# Patient Record
Sex: MALE | Race: WHITE | NOT HISPANIC OR LATINO | Employment: UNEMPLOYED | URBAN - METROPOLITAN AREA
[De-identification: names, ages, dates, MRNs, and addresses within clinical notes are randomized per-mention and may not be internally consistent; named-entity substitution may affect disease eponyms.]

---

## 2018-11-01 ENCOUNTER — HOSPITAL ENCOUNTER (OUTPATIENT)
Dept: RADIATION ONCOLOGY | Facility: HOSPITAL | Age: 63
Setting detail: RADIATION/ONCOLOGY SERIES
Discharge: HOME | End: 2018-11-01

## 2018-11-01 ENCOUNTER — OFFICE VISIT (OUTPATIENT)
Dept: RADIATION ONCOLOGY | Facility: HOSPITAL | Age: 63
End: 2018-11-01

## 2018-11-01 VITALS
OXYGEN SATURATION: 95 % | DIASTOLIC BLOOD PRESSURE: 63 MMHG | SYSTOLIC BLOOD PRESSURE: 136 MMHG | TEMPERATURE: 98.2 F | HEART RATE: 60 BPM | WEIGHT: 152.4 LBS | RESPIRATION RATE: 20 BRPM

## 2018-11-01 DIAGNOSIS — C61 PROSTATE CANCER (HCC): Primary | ICD-10-CM

## 2018-11-01 PROCEDURE — G0463 HOSPITAL OUTPT CLINIC VISIT: HCPCS

## 2018-11-01 RX ORDER — LEVOTHYROXINE SODIUM 175 UG/1
TABLET ORAL
COMMUNITY

## 2018-11-01 RX ORDER — PREDNISOLONE ACETATE 10 MG/ML
SUSPENSION/ DROPS OPHTHALMIC
COMMUNITY

## 2018-11-01 NOTE — PROGRESS NOTES
CONSULTATION NOTE      :                                                          1955  DATE OF CONSULTATION:                       2018   REQUESTING PHYSICIAN:                   Thomas Barry, *  REASON FOR CONSULTATION:         Prostate cancer (CMS/[facility])  Staging form: Prostate, AJCC 8th Edition  - Clinical: Stage IIC (cT2b, cN0, cM0, PSA: 7.6, Grade Group: 3) - Signed by Jeancarlos Leon MD on 2018       BRIEF HISTORY:  The patient is a very pleasant 63 y.o. male  with recently diagnosed prostate cancer.  He presented with an elevated PSA with maximum value of 7.61 ng/ml on 2018.  Prostate was moderately enlarged, 39.9 cc, diffusely firm but symmetric with no nodules.  Biopsy was performed 2018.  Prostatic adenocarcinoma was found in 3 out of 6 cores from the right lobe.    Jeaneth score 4+3=7 was identified in 2 cores and right apex (50% submitted tissue).  Bethes[place] score 3+4 = 7 was identified in one core from the right mid gland (less than 5% submitted tissue).  Left lobe was benign.  No evidence of metastatic disease was found in staging studies consisting of nuclear medicine bone scan and CT scans abdomen/pelvis.    No Known Allergies    Social History     Social History   • Marital status:      Social History Main Topics   • Smoking status: Current Every Day Smoker     Packs/day: 1.00     Types: Cigarettes     Start date:    • Smokeless tobacco: Never Used   • Alcohol use 0.6 oz/week     1 Cans of beer per week      Comment: 2 drinks per year   • Drug use: No   • Sexual activity: Defer     Other Topics Concern   • Not on file       Past Medical History:   Diagnosis Date   • Disease of thyroid gland    • H/O cervical fracture     was in [place] for 6 weeks   • Prostate cancer (CMS/HCC)    • Psoriasis    • Skin cancer 2018    basal cell right neck       family history includes Coronary artery disease in his father; Liver cancer in his sister; Ovarian cancer in his  mother.     Past Surgical History:   Procedure Laterality Date   • CARDIAC CATHETERIZATION  2013    x 3 all clean   • CHOLECYSTECTOMY  2003   • COLONOSCOPY  2017   • CYST REMOVAL      right wrist   • FOOT FRACTURE SURGERY     • KNEE ARTHROSCOPY Left 1988   • MOHS SURGERY  2018   • NOSE SURGERY  1995   • PAROTIDECTOMY  2015   • PROSTATE BIOPSY  2018        Review of Systems   Constitutional: Positive for fatigue.   HENT:   Positive for hearing loss and tinnitus.    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    Cardiovascular: Negative.    Gastrointestinal: Positive for nausea.   Endocrine: Negative.    Genitourinary: Positive for dysuria.    Musculoskeletal: Negative.    Skin: Positive for itching and rash.        Bilateral legs   Neurological: Negative.    Hematological: Bruises/bleeds easily.   Psychiatric/Behavioral: Negative.           IPSS Questionnaire (AUA-7):  Over the past month…    1)  Incomplete Emptying  How often have you had a sensation of not emptying your bladder?  1 - Less than 1 time in 5   2)  Frequency  How often have you had to urinate less than every two hours? 1 - Less than 1 time in 5   3)  Intermittency  How often have you found you stopped and started again several times when you urinated?  0 - Not at all   4) Urgency  How often have you found it difficult to postpone urination?  2 - Less than half the time   5) Weak Stream  How often have you had a weak urinary stream?  0 - Not at all   6) Straining  How often have you had to push or strain to begin urination?  0 - Not at all   7) Nocturia  How many times did you typically get up at night to urinate?  1 - 1 time   Total Score:  5       Quality of life due to urinary symptoms:  If you were to spend the rest of your life with your urinary condition the way it is now, how would you feel about that? 2-Mostly Satisfied   Urine Leakage (Incontinence) 0-No Leakage     Sexual Health Inventory  Current Status    1)  How do  you rate your confidence that you could achieve and keep an erection? 2-Low   2) When you had erections with sexual stimulation, how often were your erections hard enough for penetration (entering your partner)? 3-Sometime (about half the time)   3)  During sexual intercourse, how often were you able to maintain your erection after you had penetrated (entered) into your partner? 5-Almost always or always   4) During sexual intercourse, how difficult was it to maintain your erection to completion of intercourse? 5-Not difficult   5) When you attempted sexual intercourse, how often was it satisfactory to you? 2-A few times (much less than half the time)   Total Score: 17       Bowel Health Inventory  Current Status: 0-No problems, no rectal bleeding, no discharge, less then 5 bowel movements a day            Objective   VITAL SIGNS:   Vitals:    11/01/18 1025   BP: 136/63   Pulse: 60   Resp: 20   Temp: 98.2 °F (36.8 °C)   TempSrc: Temporal Artery    SpO2: 95%   Weight: 69.1 kg (152 lb 6.4 oz)   PainSc: 1  Comment: left   PainLoc: Rib Cage        Karnofsky score: 90      Physical Exam   Constitutional: He is oriented to person, place, and time. He appears well-developed and well-nourished.   HENT:   Head: Normocephalic and atraumatic.   Neck: Normal range of motion. Neck supple.   Cardiovascular: Normal rate, regular rhythm and normal heart sounds.    No murmur heard.  Pulmonary/Chest: Effort normal and breath sounds normal. He has no wheezes. He has no rales.   Abdominal: Soft. Bowel sounds are normal. He exhibits no distension. There is no hepatosplenomegaly. There is no tenderness.   Genitourinary: Rectal exam shows no external hemorrhoid, no mass and no tenderness. Prostate is enlarged (30 cc, symmetric, no nodules.  Seminal vesicles not palpable.). Prostate is not tender.   Musculoskeletal: Normal range of motion. He exhibits no edema or tenderness.   Lymphadenopathy:     He has no cervical adenopathy.     He has  no axillary adenopathy.        Right: No supraclavicular adenopathy present.        Left: No supraclavicular adenopathy present.   Neurological: He is alert and oriented to person, place, and time. He has normal strength. No sensory deficit.   Skin: Skin is warm and dry.   Psychiatric: He has a normal mood and affect. His behavior is normal. Judgment and thought content normal.   Nursing note and vitals reviewed.           The following portions of the patient's history were reviewed and updated as appropriate: allergies, current medications, past family history, past medical history, past social history, past surgical history and problem list.    Assessment      Prostate cancer, [place] score 4+3=7, clinical stage IIC (T2b, N0, M0) with PSA 7.61 ng/ml.  He has intermediate risk disease.  He should still have an excellent prognosis.  Definitive treatment is indicated.  We reviewed the radiation treatment options:   -He is a candidate for conventionally fractionated IMRT with concurrent short course androgen ablation, however, he is not interested in a prolonged course of treatment or androgen suppression, unless necessary.   -He is a candidate for brachytherapy, however, he is not interested in that modality due to the radiation precautions, since he cares for his grandchildren.   -He is a candidate for stereotactic body radiotherapy as a sole modality without the need for androgen ablation.    He would like to proceed with SBRT.  The CyberKnife treatment procedure has been reviewed in detail.  Informed consent obtained today.    RECOMMENDATIONS:  He will return to Dr. Barry for placement of gold seed fiducials.  Approximately one week later he will return for treatment planning CT and MRI pelvis.  The prostate gland and proximal seminal vesicles will receive 5 fractions of 7 Gray each on the CyberKnife.    Smoking cessation was strongly encouraged.    Return in about 4 weeks (around 11/29/2018) for Office Visit,  Simulation, CyberKnife treatment.  Emmanuel was seen today for prostate cancer.    Diagnoses and all orders for this visit:    Prostate cancer (CMS/[facility])         Jeancarlos Leon MD

## 2018-11-13 ENCOUNTER — DOCUMENTATION (OUTPATIENT)
Dept: RADIATION ONCOLOGY | Facility: HOSPITAL | Age: 63
End: 2018-11-13

## 2018-11-13 NOTE — PROGRESS NOTES
RADIATION ONCOLOGY PROGRESS NOTE  11/13/18  Spoke with [facility]. He will call me back with urologist that places gold markers.   There were no vitals filed for this visit.

## 2018-11-16 ENCOUNTER — TELEPHONE (OUTPATIENT)
Dept: RADIATION ONCOLOGY | Facility: HOSPITAL | Age: 63
End: 2018-11-16

## 2018-11-16 NOTE — TELEPHONE ENCOUNTER
Called pt and informed him to be here 11/27/18 @ 1330 to check markers on cyberknife.  Pt verbalized understanding.

## 2018-11-27 ENCOUNTER — DOCUMENTATION (OUTPATIENT)
Dept: NUTRITION | Facility: HOSPITAL | Age: 63
End: 2018-11-27

## 2018-11-28 ENCOUNTER — TELEPHONE (OUTPATIENT)
Dept: RADIATION ONCOLOGY | Facility: HOSPITAL | Age: 63
End: 2018-11-28

## 2018-11-28 DIAGNOSIS — C61 PROSTATE CANCER (HCC): Primary | ICD-10-CM

## 2018-12-03 ENCOUNTER — HOSPITAL ENCOUNTER (OUTPATIENT)
Dept: RADIATION ONCOLOGY | Facility: HOSPITAL | Age: 63
Setting detail: RADIATION/ONCOLOGY SERIES
Discharge: HOME | End: 2018-12-03

## 2018-12-03 ENCOUNTER — HOSPITAL ENCOUNTER (OUTPATIENT)
Dept: RADIATION ONCOLOGY | Facility: HOSPITAL | Age: 63
Discharge: HOME | End: 2018-12-03

## 2018-12-03 ENCOUNTER — HOSPITAL ENCOUNTER (OUTPATIENT)
Dept: MRI IMAGING | Facility: HOSPITAL | Age: 63
Discharge: HOME | End: 2018-12-03
Attending: RADIOLOGY | Admitting: RADIOLOGY

## 2018-12-03 DIAGNOSIS — C61 PROSTATE CANCER (HCC): ICD-10-CM

## 2018-12-03 PROCEDURE — 72195 MRI PELVIS W/O DYE: CPT

## 2018-12-03 PROCEDURE — 77290 THER RAD SIMULAJ FIELD CPLX: CPT | Performed by: RADIOLOGY

## 2018-12-03 PROCEDURE — 77470 SPECIAL RADIATION TREATMENT: CPT | Performed by: RADIOLOGY

## 2018-12-18 PROCEDURE — 77334 RADIATION TREATMENT AID(S): CPT | Performed by: RADIOLOGY

## 2018-12-18 PROCEDURE — 77370 RADIATION PHYSICS CONSULT: CPT | Performed by: RADIOLOGY

## 2018-12-18 PROCEDURE — 77300 RADIATION THERAPY DOSE PLAN: CPT | Performed by: RADIOLOGY

## 2018-12-18 PROCEDURE — 77295 3-D RADIOTHERAPY PLAN: CPT | Performed by: RADIOLOGY

## 2018-12-19 RX ORDER — ALFUZOSIN HYDROCHLORIDE 10 MG/1
10 TABLET, EXTENDED RELEASE ORAL NIGHTLY
Status: DISCONTINUED | OUTPATIENT
Start: 2018-12-19 | End: 2018-12-19 | Stop reason: HOSPADM

## 2019-01-02 ENCOUNTER — DOCUMENTATION (OUTPATIENT)
Dept: RADIATION ONCOLOGY | Facility: HOSPITAL | Age: 64
End: 2019-01-02

## 2019-01-02 RX ORDER — ALFUZOSIN HYDROCHLORIDE 10 MG/1
10 TABLET, EXTENDED RELEASE ORAL DAILY
Qty: 30 TABLET | Refills: 11 | Status: SHIPPED | OUTPATIENT
Start: 2019-01-02 | End: 2019-09-20

## 2019-01-02 RX ORDER — TAMSULOSIN HYDROCHLORIDE 0.4 MG/1
1 CAPSULE ORAL NIGHTLY
Qty: 30 CAPSULE | Refills: 11 | Status: SHIPPED | OUTPATIENT
Start: 2019-01-02 | End: 2019-01-02

## 2019-01-07 ENCOUNTER — HOSPITAL ENCOUNTER (OUTPATIENT)
Dept: RADIATION ONCOLOGY | Facility: HOSPITAL | Age: 64
Discharge: HOME | End: 2019-01-07

## 2019-01-07 ENCOUNTER — HOSPITAL ENCOUNTER (OUTPATIENT)
Dept: RADIATION ONCOLOGY | Facility: HOSPITAL | Age: 64
Setting detail: RADIATION/ONCOLOGY SERIES
Discharge: HOME | End: 2019-01-07

## 2019-01-07 PROCEDURE — 77280 THER RAD SIMULAJ FIELD SMPL: CPT | Performed by: RADIOLOGY

## 2019-01-07 PROCEDURE — 77373 STRTCTC BDY RAD THER TX DLVR: CPT | Performed by: RADIOLOGY

## 2019-01-08 ENCOUNTER — HOSPITAL ENCOUNTER (OUTPATIENT)
Dept: RADIATION ONCOLOGY | Facility: HOSPITAL | Age: 64
Discharge: HOME | End: 2019-01-08

## 2019-01-08 ENCOUNTER — APPOINTMENT (OUTPATIENT)
Dept: RADIATION ONCOLOGY | Facility: HOSPITAL | Age: 64
End: 2019-01-08

## 2019-01-08 PROCEDURE — 77373 STRTCTC BDY RAD THER TX DLVR: CPT | Performed by: RADIOLOGY

## 2019-01-09 ENCOUNTER — HOSPITAL ENCOUNTER (OUTPATIENT)
Dept: RADIATION ONCOLOGY | Facility: HOSPITAL | Age: 64
Discharge: HOME | End: 2019-01-09

## 2019-01-09 PROCEDURE — 77373 STRTCTC BDY RAD THER TX DLVR: CPT | Performed by: RADIOLOGY

## 2019-01-10 ENCOUNTER — HOSPITAL ENCOUNTER (OUTPATIENT)
Dept: RADIATION ONCOLOGY | Facility: HOSPITAL | Age: 64
Discharge: HOME | End: 2019-01-10

## 2019-01-10 PROCEDURE — 77373 STRTCTC BDY RAD THER TX DLVR: CPT | Performed by: RADIOLOGY

## 2019-01-11 ENCOUNTER — HOSPITAL ENCOUNTER (OUTPATIENT)
Dept: RADIATION ONCOLOGY | Facility: HOSPITAL | Age: 64
Discharge: HOME | End: 2019-01-11

## 2019-01-11 PROCEDURE — 77373 STRTCTC BDY RAD THER TX DLVR: CPT | Performed by: RADIOLOGY

## 2019-01-11 PROCEDURE — 77336 RADIATION PHYSICS CONSULT: CPT | Performed by: RADIOLOGY

## 2019-01-11 RX ORDER — AZITHROMYCIN 250 MG/1
TABLET, FILM COATED ORAL
Qty: 6 TABLET | Refills: 0 | Status: SHIPPED | OUTPATIENT
Start: 2019-01-11 | End: 2019-02-18

## 2019-01-18 ENCOUNTER — TELEPHONE (OUTPATIENT)
Dept: RADIATION ONCOLOGY | Facility: HOSPITAL | Age: 64
End: 2019-01-18

## 2019-01-21 ENCOUNTER — TELEPHONE (OUTPATIENT)
Dept: RADIATION ONCOLOGY | Facility: HOSPITAL | Age: 64
End: 2019-01-21

## 2019-01-21 NOTE — TELEPHONE ENCOUNTER
Called to check on pt and he states that cystex has helped tremendously. He states flow is good and  no burning with urination.   He states his bowel movements have changed.  No diarrhea or constipation just changed.  I explained that with time they should go back to normal.  Instructed to call with any other problems.  Pt verbalized understanding.

## 2019-02-18 ENCOUNTER — HOSPITAL ENCOUNTER (OUTPATIENT)
Dept: RADIATION ONCOLOGY | Facility: HOSPITAL | Age: 64
Setting detail: RADIATION/ONCOLOGY SERIES
Discharge: HOME | End: 2019-02-18

## 2019-02-18 ENCOUNTER — OFFICE VISIT (OUTPATIENT)
Dept: RADIATION ONCOLOGY | Facility: HOSPITAL | Age: 64
End: 2019-02-18

## 2019-02-18 VITALS
SYSTOLIC BLOOD PRESSURE: 124 MMHG | WEIGHT: 152.7 LBS | RESPIRATION RATE: 20 BRPM | HEART RATE: 104 BPM | OXYGEN SATURATION: 94 % | TEMPERATURE: 98.3 F | DIASTOLIC BLOOD PRESSURE: 63 MMHG

## 2019-02-18 DIAGNOSIS — C61 PROSTATE CANCER (HCC): Primary | ICD-10-CM

## 2019-02-18 PROCEDURE — G0463 HOSPITAL OUTPT CLINIC VISIT: HCPCS

## 2019-02-18 NOTE — PROGRESS NOTES
FOLLOW UP NOTE    PATIENT:                                                      Emmanuel Whitten  MEDICAL RECORD #:                        [id number]  :                                                          1955  COMPLETION DATE:   19  DIAGNOSIS:     Prostate cancer (CMS/[facility])  Staging form: Prostate, AJCC 8th Edition  - Clinical: Stage IIC (cT2b, cN0, cM0, PSA: 7.6, Grade Group: 3) - Signed by Jeancarlos Leon MD on 2018    BRIEF HISTORY:    Initial follow-up visit after CyberKnife SBRT for intermediate risk prostate cancer.  He experienced significant fatigue but is continuing to gain strength and stamina.  He experienced brief dysuria initially relieved with Cystex.  He has since discontinued use of Cystex.  He continues to use alfuzosin and has fairly normal urinary stream and no significant nocturia.  He experienced brief urgency of bowel which has resolved.  Stools are still soft and thin.  No hematochezia.  He has psoriasis, which has progressed some recently.  He reports that he is planned for intralesional injections by his dermatologist, as soon as he is cleared from his prostate cancer.  He has not yet been successful smoking cessation.    MEDICATIONS: Medication reconciliation for the patient was reviewed and confirmed in the electronic medical record.    Review of Systems   Constitutional: Positive for fatigue.   Eyes: Negative.    Respiratory: Positive for cough and shortness of breath.    Cardiovascular: Negative.    Gastrointestinal: Negative.    Endocrine: Negative.    Genitourinary: Negative.     Musculoskeletal: Negative.    Skin: Negative.    Neurological: Positive for dizziness, headaches and light-headedness.   Hematological: Negative.    Psychiatric/Behavioral: Negative.          IPSS Questionnaire (AUA-7):  Over the past month…     1)  Incomplete Emptying  How often have you had a sensation of not emptying your bladder?  1 - Less than 1 time in 5   2)  Frequency  How  often have you had to urinate less than every two hours? 1 - Less than 1 time in 5   3)  Intermittency  How often have you found you stopped and started again several times when you urinated?  0 - Not at all   4) Urgency  How often have you found it difficult to postpone urination?  2 - Less than half the time   5) Weak Stream  How often have you had a weak urinary stream?  0 - Not at all   6) Straining  How often have you had to push or strain to begin urination?  0 - Not at all   7) Nocturia  How many times did you typically get up at night to urinate?  1 - 1 time   Total Score:  5         Quality of life due to urinary symptoms:  If you were to spend the rest of your life with your urinary condition the way it is now, how would you feel about that? 2-Mostly Satisfied   Urine Leakage (Incontinence) 0-No Leakage      Sexual Health Inventory  Current Status     1)  How do you rate your confidence that you could achieve and keep an erection? 2-Low   2) When you had erections with sexual stimulation, how often were your erections hard enough for penetration (entering your partner)? 3-Sometime (about half the time)   3)  During sexual intercourse, how often were you able to maintain your erection after you had penetrated (entered) into your partner? 5-Almost always or always   4) During sexual intercourse, how difficult was it to maintain your erection to completion of intercourse? 5-Not difficult   5) When you attempted sexual intercourse, how often was it satisfactory to you? 2-A few times (much less than half the time)   Total Score: 17         Bowel Health Inventory  Current Status: 0-No problems, no rectal bleeding, no discharge, less then 5 bowel movements a day                   KPS 80%    Physical Exam   Constitutional: He is oriented to person, place, and time. He appears well-developed and well-nourished.   HENT:   Head: Normocephalic and atraumatic.   Neck: Normal range of motion. Neck supple.    Cardiovascular: Normal rate, regular rhythm and normal heart sounds.   No murmur heard.  Pulmonary/Chest: Effort normal and breath sounds normal. He has no wheezes. He has no rales.   Abdominal: Soft. Bowel sounds are normal. He exhibits no distension. There is no hepatosplenomegaly. There is no tenderness.   Musculoskeletal: Normal range of motion. He exhibits no edema or tenderness.   Lymphadenopathy:     He has no cervical adenopathy.     He has no axillary adenopathy.        Right: No supraclavicular adenopathy present.        Left: No supraclavicular adenopathy present.   Neurological: He is alert and oriented to person, place, and time. He has normal strength. No sensory deficit.   Skin: Skin is warm and dry.   Psychiatric: He has a normal mood and affect. His behavior is normal. Judgment and thought content normal.   Nursing note and vitals reviewed.      VITAL SIGNS:   Vitals:    02/18/19 1541   BP: 124/63   Pulse: 104   Resp: 20   Temp: 98.3 °F (36.8 °C)   TempSrc: Temporal   SpO2: 94%   Weight: 69.3 kg (152 lb 11.2 oz)   PainSc: 0-No pain       The following portions of the patient's history were reviewed and updated as appropriate: allergies, current medications, past family history, past medical history, past social history, past surgical history and problem list.         Emmanuel was seen today for prostate cancer.    Diagnoses and all orders for this visit:    Prostate cancer (CMS/[facility])         IMPRESSION:  Prostate cancer, Jeaneth score 4+3=7, clinical stage IIC (T2b, N0, M0) with PSA 7.61 ng/ml.  He has tolerated CyberKnife SBRT fairly well.  He reports that he is scheduled for his first post treatment PSA test on 4/11/2019.    RECOMMENDATIONS:  He plans to continue urologic surveillance under the care of Dr. Barry.  He will continue alfuzosin, for now.  Smoking cessation is again strongly encouraged.  I'll be happy to discuss his prostate cancer treatment with his dermatologists, if additional  information is required before psoriatic treatment is initiated.    Return in about 6 months (around 8/18/2019) for Office Visit.    Jeancarlos Leon MD    Errors in dictation may reflect use of voice recognition software and not all errors in transcription may have been detected prior to signing.

## 2019-03-18 ENCOUNTER — APPOINTMENT (OUTPATIENT)
Dept: RADIATION ONCOLOGY | Facility: HOSPITAL | Age: 64
End: 2019-03-18

## 2019-09-20 ENCOUNTER — OFFICE VISIT (OUTPATIENT)
Dept: RADIATION ONCOLOGY | Facility: HOSPITAL | Age: 64
End: 2019-09-20

## 2019-09-20 ENCOUNTER — HOSPITAL ENCOUNTER (OUTPATIENT)
Dept: RADIATION ONCOLOGY | Facility: HOSPITAL | Age: 64
Setting detail: RADIATION/ONCOLOGY SERIES
Discharge: HOME | End: 2019-09-20

## 2019-09-20 VITALS
OXYGEN SATURATION: 92 % | DIASTOLIC BLOOD PRESSURE: 58 MMHG | RESPIRATION RATE: 20 BRPM | TEMPERATURE: 98 F | HEART RATE: 74 BPM | SYSTOLIC BLOOD PRESSURE: 122 MMHG | WEIGHT: 144.8 LBS

## 2019-09-20 DIAGNOSIS — C61 PROSTATE CANCER (HCC): Primary | ICD-10-CM

## 2019-09-20 PROCEDURE — G0463 HOSPITAL OUTPT CLINIC VISIT: HCPCS

## 2019-09-20 NOTE — PROGRESS NOTES
FOLLOW UP NOTE    PATIENT:                                                      Emmanuel Whitten  MEDICAL RECORD #:                        [id number]  :                                                          1955  COMPLETION DATE:   2019  DIAGNOSIS:     Prostate cancer (CMS/HCC)  Staging form: Prostate, AJCC 8th Edition  - Clinical: Stage IIC (cT2b, cN0, cM0, PSA: 7.6, Grade Group: 3) - Signed by Jeancarlos Leon MD on 2018    BRIEF HISTORY:    Routine follow-up visit.  He has a history of intermediate risk prostate cancer treated with CyberKnife SBRT.  He continues to do well with no difficulty voiding.  He has been able to discontinue use of alfuzosin.  Bowels are normal.  Energy is a bit low but stable.  He is experienced weight loss due to poor caloric intake related to poor fitting dentures and pain with mastication.  He is in need of new dentures.  He reports his last PSA value 0.9 ng/ml from the [facility].    He has not been successful smoking cessation.    MEDICATIONS: Medication reconciliation for the patient was reviewed and confirmed in the electronic medical record.    Review of Systems   Constitutional: Positive for fatigue.   HENT:   Positive for hearing loss.    Eyes: Negative.    Respiratory: Positive for cough, shortness of breath and wheezing.    Cardiovascular: Negative.    Gastrointestinal: Negative.    Endocrine: Negative.    Genitourinary: Negative.     Musculoskeletal: Positive for back pain and gait problem.   Skin: Negative.    Neurological: Positive for gait problem.   Hematological: Negative.    Psychiatric/Behavioral: Negative.        IPSS Questionnaire (AUA-7):  Over the past month…     1)  Incomplete Emptying  How often have you had a sensation of not emptying your bladder?  1 - Less than 1 time in 5   2)  Frequency  How often have you had to urinate less than every two hours? 1 - Less than 1 time in 5   3)  Intermittency  How often have you found you stopped and  started again several times when you urinated?  0 - Not at all   4) Urgency  How often have you found it difficult to postpone urination?  2 - Less than half the time   5) Weak Stream  How often have you had a weak urinary stream?  0 - Not at all   6) Straining  How often have you had to push or strain to begin urination?  0 - Not at all   7) Nocturia  How many times did you typically get up at night to urinate?  1 - 1 time   Total Score:  5         Quality of life due to urinary symptoms:  If you were to spend the rest of your life with your urinary condition the way it is now, how would you feel about that? 2-Mostly Satisfied   Urine Leakage (Incontinence) 0-No Leakage      Sexual Health Inventory  Current Status     1)  How do you rate your confidence that you could achieve and keep an erection? 2-Low   2) When you had erections with sexual stimulation, how often were your erections hard enough for penetration (entering your partner)? 3-Sometime (about half the time)   3)  During sexual intercourse, how often were you able to maintain your erection after you had penetrated (entered) into your partner? 5-Almost always or always   4) During sexual intercourse, how difficult was it to maintain your erection to completion of intercourse? 5-Not difficult   5) When you attempted sexual intercourse, how often was it satisfactory to you? 2-A few times (much less than half the time)   Total Score: 17         Bowel Health Inventory  Current Status: 0-No problems, no rectal bleeding, no discharge, less then 5 bowel movements a day               KPS 70%    Physical Exam   Constitutional: He is oriented to person, place, and time. He appears well-developed and well-nourished.   HENT:   Head: Normocephalic and atraumatic.   Neck: Normal range of motion. Neck supple.   Cardiovascular: Normal rate, regular rhythm and normal heart sounds.   No murmur heard.  Pulmonary/Chest: Effort normal and breath sounds normal. He has no  wheezes. He has no rales.   Abdominal: Soft. Bowel sounds are normal. He exhibits no distension. There is no hepatosplenomegaly. There is no tenderness.   Genitourinary: Rectal exam shows no external hemorrhoid, no internal hemorrhoid, no fissure, no mass, no tenderness and anal tone normal. Prostate is not enlarged ( Small, round, 20 cc, symmetric, smooth surface, no nodules or induration.) and not tender.   Musculoskeletal: Normal range of motion. He exhibits no edema or tenderness.   Lymphadenopathy:     He has no cervical adenopathy.     He has no axillary adenopathy.        Right: No supraclavicular adenopathy present.        Left: No supraclavicular adenopathy present.   Neurological: He is alert and oriented to person, place, and time. He has normal strength. No sensory deficit.   Skin: Skin is warm and dry.   Psychiatric: He has a normal mood and affect. His behavior is normal. Judgment and thought content normal.   Nursing note and vitals reviewed.      VITAL SIGNS:   Vitals:    09/20/19 1015   BP: 122/58   Pulse: 74   Resp: 20   Temp: 98 °F (36.7 °C)   TempSrc: Temporal   SpO2: 92%   Weight: 65.7 kg (144 lb 12.8 oz)   PainSc: 0-No pain       The following portions of the patient's history were reviewed and updated as appropriate: allergies, current medications, past family history, past medical history, past social history, past surgical history and problem list.         Emmanuel was seen today for prostate cancer.    Diagnoses and all orders for this visit:    Prostate cancer (CMS/[facility])         IMPRESSION:  Prostate cancer, [place] score 4+3=7, clinical stage IIC (T2b, N0, M0) with pretreatment PSA 7.61 ng/ml.  He has tolerated treatment well.  He has so far had an excellent early clinical/biochemical response to treatment.  Prognosis remains very good, but recurrence risk would be even less if he could quit smoking.  He is experienced weight loss due to pain associated with ill fitting dentures and is in  need of intervention to help preserve good nutritional intake.    RECOMMENDATIONS: [facility]  I would like to see him back for one additional follow-up in 1 year at which time I would hope he has reached jeane PSA value less than 0.5 ng/ml.    Smoking cessation again strongly encouraged.    Return in about 1 year (around 9/20/2020) for Office Visit.    Jeancarlos Leon MD    Errors in dictation may reflect use of voice recognition software and not all errors in transcription may have been detected prior to signing.

## 2024-05-10 ENCOUNTER — APPOINTMENT (OUTPATIENT)
Dept: OTHER | Facility: HOSPITAL | Age: 69
End: 2024-05-10
Payer: OTHER GOVERNMENT

## 2024-05-10 ENCOUNTER — HOSPITAL ENCOUNTER (OUTPATIENT)
Facility: HOSPITAL | Age: 69
Setting detail: OBSERVATION
Discharge: HOME | End: 2024-05-11
Attending: INTERNAL MEDICINE | Admitting: INTERNAL MEDICINE
Payer: OTHER GOVERNMENT

## 2024-05-10 ENCOUNTER — APPOINTMENT (OUTPATIENT)
Dept: MRI IMAGING | Facility: HOSPITAL | Age: 69
End: 2024-05-10
Payer: OTHER GOVERNMENT

## 2024-05-10 ENCOUNTER — APPOINTMENT (OUTPATIENT)
Dept: NEUROLOGY | Facility: HOSPITAL | Age: 69
End: 2024-05-10
Payer: OTHER GOVERNMENT

## 2024-05-10 ENCOUNTER — APPOINTMENT (OUTPATIENT)
Dept: CARDIOLOGY | Facility: HOSPITAL | Age: 69
End: 2024-05-10
Payer: MEDICARE

## 2024-05-10 DIAGNOSIS — R55 SYNCOPE, UNSPECIFIED SYNCOPE TYPE: ICD-10-CM

## 2024-05-10 DIAGNOSIS — R56.9 FOCAL SEIZURES: Primary | ICD-10-CM

## 2024-05-10 LAB
ALBUMIN SERPL-MCNC: 3.6 G/DL (ref 3.5–5.2)
ALBUMIN/GLOB SERPL: 1.2 G/DL
ALP SERPL-CCNC: 104 U/L (ref 39–117)
ALT SERPL W P-5'-P-CCNC: 9 U/L (ref 1–41)
ANION GAP SERPL CALCULATED.3IONS-SCNC: 8 MMOL/L (ref 5–15)
AST SERPL-CCNC: 15 U/L (ref 1–40)
BASOPHILS # BLD AUTO: 0.06 10*3/MM3 (ref 0–0.2)
BASOPHILS NFR BLD AUTO: 0.7 % (ref 0–1.5)
BH CV ECHO MEAS - AI P1/2T: 569 MSEC
BH CV ECHO MEAS - AO MAX PG: 5.5 MMHG
BH CV ECHO MEAS - AO MEAN PG: 3 MMHG
BH CV ECHO MEAS - AO ROOT DIAM: 3.7 CM
BH CV ECHO MEAS - AO V2 MAX: 117 CM/SEC
BH CV ECHO MEAS - AO V2 VTI: 23.6 CM
BH CV ECHO MEAS - AVA(I,D): 1.93 CM2
BH CV ECHO MEAS - EDV(CUBED): 54.9 ML
BH CV ECHO MEAS - EDV(MOD-SP2): 44.9 ML
BH CV ECHO MEAS - EDV(MOD-SP4): 73.8 ML
BH CV ECHO MEAS - EF(MOD-BP): 58.9 %
BH CV ECHO MEAS - EF(MOD-SP2): 54.6 %
BH CV ECHO MEAS - EF(MOD-SP4): 63.4 %
BH CV ECHO MEAS - ESV(CUBED): 13.8 ML
BH CV ECHO MEAS - ESV(MOD-SP2): 20.4 ML
BH CV ECHO MEAS - ESV(MOD-SP4): 27 ML
BH CV ECHO MEAS - FS: 36.8 %
BH CV ECHO MEAS - IVS/LVPW: 1.13 CM
BH CV ECHO MEAS - IVSD: 0.9 CM
BH CV ECHO MEAS - LA DIMENSION: 2.5 CM
BH CV ECHO MEAS - LAT PEAK E' VEL: 9.5 CM/SEC
BH CV ECHO MEAS - LV DIASTOLIC VOL/BSA (35-75): 40 CM2
BH CV ECHO MEAS - LV MASS(C)D: 93.4 GRAMS
BH CV ECHO MEAS - LV MAX PG: 3.9 MMHG
BH CV ECHO MEAS - LV MEAN PG: 2 MMHG
BH CV ECHO MEAS - LV SYSTOLIC VOL/BSA (12-30): 14.6 CM2
BH CV ECHO MEAS - LV V1 MAX: 98.2 CM/SEC
BH CV ECHO MEAS - LV V1 VTI: 20.1 CM
BH CV ECHO MEAS - LVIDD: 3.8 CM
BH CV ECHO MEAS - LVIDS: 2.4 CM
BH CV ECHO MEAS - LVOT AREA: 2.27 CM2
BH CV ECHO MEAS - LVOT DIAM: 1.7 CM
BH CV ECHO MEAS - LVPWD: 0.8 CM
BH CV ECHO MEAS - MED PEAK E' VEL: 8.4 CM/SEC
BH CV ECHO MEAS - MV A MAX VEL: 48.7 CM/SEC
BH CV ECHO MEAS - MV DEC SLOPE: 291 CM/SEC2
BH CV ECHO MEAS - MV DEC TIME: 0.22 SEC
BH CV ECHO MEAS - MV E MAX VEL: 74.9 CM/SEC
BH CV ECHO MEAS - MV E/A: 1.54
BH CV ECHO MEAS - MV MAX PG: 2.9 MMHG
BH CV ECHO MEAS - MV MEAN PG: 1 MMHG
BH CV ECHO MEAS - MV P1/2T: 84.3 MSEC
BH CV ECHO MEAS - MV V2 VTI: 33.3 CM
BH CV ECHO MEAS - MVA(P1/2T): 2.6 CM2
BH CV ECHO MEAS - MVA(VTI): 1.37 CM2
BH CV ECHO MEAS - PA ACC TIME: 0.17 SEC
BH CV ECHO MEAS - PA V2 MAX: 97.5 CM/SEC
BH CV ECHO MEAS - RAP SYSTOLE: 8 MMHG
BH CV ECHO MEAS - RVSP: 31.2 MMHG
BH CV ECHO MEAS - SV(LVOT): 45.6 ML
BH CV ECHO MEAS - SV(MOD-SP2): 24.5 ML
BH CV ECHO MEAS - SV(MOD-SP4): 46.8 ML
BH CV ECHO MEAS - SVI(LVOT): 24.7 ML/M2
BH CV ECHO MEAS - SVI(MOD-SP2): 13.3 ML/M2
BH CV ECHO MEAS - SVI(MOD-SP4): 25.3 ML/M2
BH CV ECHO MEAS - TAPSE (>1.6): 1.56 CM
BH CV ECHO MEAS - TR MAX PG: 23.2 MMHG
BH CV ECHO MEAS - TR MAX VEL: 241 CM/SEC
BH CV ECHO MEASUREMENTS AVERAGE E/E' RATIO: 8.37
BH CV ECHO SHUNT ASSESSMENT PERFORMED (HIDDEN SCRIPTING): 1
BH CV XLRA - RV BASE: 3.8 CM
BH CV XLRA - RV LENGTH: 6.1 CM
BH CV XLRA - RV MID: 3.1 CM
BH CV XLRA - TDI S': 8.7 CM/SEC
BILIRUB SERPL-MCNC: 0.2 MG/DL (ref 0–1.2)
BUN SERPL-MCNC: 13 MG/DL (ref 8–23)
BUN/CREAT SERPL: 12.1 (ref 7–25)
CALCIUM SPEC-SCNC: 8.7 MG/DL (ref 8.6–10.5)
CHLORIDE SERPL-SCNC: 105 MMOL/L (ref 98–107)
CHOLEST SERPL-MCNC: 209 MG/DL (ref 0–200)
CO2 SERPL-SCNC: 26 MMOL/L (ref 22–29)
CREAT SERPL-MCNC: 1.07 MG/DL (ref 0.76–1.27)
DEPRECATED RDW RBC AUTO: 50 FL (ref 37–54)
EGFRCR SERPLBLD CKD-EPI 2021: 75.6 ML/MIN/1.73
EOSINOPHIL # BLD AUTO: 1.6 10*3/MM3 (ref 0–0.4)
EOSINOPHIL NFR BLD AUTO: 18.6 % (ref 0.3–6.2)
ERYTHROCYTE [DISTWIDTH] IN BLOOD BY AUTOMATED COUNT: 14 % (ref 12.3–15.4)
GEN 5 2HR TROPONIN T REFLEX: 6 NG/L
GLOBULIN UR ELPH-MCNC: 2.9 GM/DL
GLUCOSE BLDC GLUCOMTR-MCNC: 101 MG/DL (ref 70–130)
GLUCOSE BLDC GLUCOMTR-MCNC: 115 MG/DL (ref 70–130)
GLUCOSE BLDC GLUCOMTR-MCNC: 85 MG/DL (ref 70–130)
GLUCOSE SERPL-MCNC: 103 MG/DL (ref 65–99)
HBA1C MFR BLD: 5.4 % (ref 4.8–5.6)
HCT VFR BLD AUTO: 41.7 % (ref 37.5–51)
HDLC SERPL-MCNC: 38 MG/DL (ref 40–60)
HGB BLD-MCNC: 14.1 G/DL (ref 13–17.7)
IMM GRANULOCYTES # BLD AUTO: 0.02 10*3/MM3 (ref 0–0.05)
IMM GRANULOCYTES NFR BLD AUTO: 0.2 % (ref 0–0.5)
IVRT: 111 MS
LDLC SERPL CALC-MCNC: 149 MG/DL (ref 0–100)
LDLC/HDLC SERPL: 3.86 {RATIO}
LEFT ATRIUM VOLUME INDEX: 14.8 ML/M2
LYMPHOCYTES # BLD AUTO: 2.21 10*3/MM3 (ref 0.7–3.1)
LYMPHOCYTES NFR BLD AUTO: 25.7 % (ref 19.6–45.3)
MAGNESIUM SERPL-MCNC: 2.2 MG/DL (ref 1.6–2.4)
MCH RBC QN AUTO: 32.6 PG (ref 26.6–33)
MCHC RBC AUTO-ENTMCNC: 33.8 G/DL (ref 31.5–35.7)
MCV RBC AUTO: 96.3 FL (ref 79–97)
MONOCYTES # BLD AUTO: 0.66 10*3/MM3 (ref 0.1–0.9)
MONOCYTES NFR BLD AUTO: 7.7 % (ref 5–12)
NEUTROPHILS NFR BLD AUTO: 4.06 10*3/MM3 (ref 1.7–7)
NEUTROPHILS NFR BLD AUTO: 47.1 % (ref 42.7–76)
NRBC BLD AUTO-RTO: 0 /100 WBC (ref 0–0.2)
PLATELET # BLD AUTO: 250 10*3/MM3 (ref 140–450)
PMV BLD AUTO: 10.3 FL (ref 6–12)
POTASSIUM SERPL-SCNC: 4.3 MMOL/L (ref 3.5–5.2)
PROT SERPL-MCNC: 6.5 G/DL (ref 6–8.5)
RBC # BLD AUTO: 4.33 10*6/MM3 (ref 4.14–5.8)
SODIUM SERPL-SCNC: 139 MMOL/L (ref 136–145)
TRIGL SERPL-MCNC: 121 MG/DL (ref 0–150)
TROPONIN T DELTA: NORMAL
TROPONIN T SERPL HS-MCNC: <6 NG/L
TSH SERPL DL<=0.05 MIU/L-ACNC: 0.2 UIU/ML (ref 0.27–4.2)
VLDLC SERPL-MCNC: 22 MG/DL (ref 5–40)
WBC NRBC COR # BLD AUTO: 8.61 10*3/MM3 (ref 3.4–10.8)

## 2024-05-10 PROCEDURE — G0378 HOSPITAL OBSERVATION PER HR: HCPCS

## 2024-05-10 PROCEDURE — 93306 TTE W/DOPPLER COMPLETE: CPT | Performed by: INTERNAL MEDICINE

## 2024-05-10 PROCEDURE — 83735 ASSAY OF MAGNESIUM: CPT | Performed by: INTERNAL MEDICINE

## 2024-05-10 PROCEDURE — 83036 HEMOGLOBIN GLYCOSYLATED A1C: CPT

## 2024-05-10 PROCEDURE — 84443 ASSAY THYROID STIM HORMONE: CPT | Performed by: INTERNAL MEDICINE

## 2024-05-10 PROCEDURE — 95819 EEG AWAKE AND ASLEEP: CPT | Performed by: PSYCHIATRY & NEUROLOGY

## 2024-05-10 PROCEDURE — 99204 OFFICE O/P NEW MOD 45 MIN: CPT

## 2024-05-10 PROCEDURE — 82948 REAGENT STRIP/BLOOD GLUCOSE: CPT

## 2024-05-10 PROCEDURE — 92523 SPEECH SOUND LANG COMPREHEN: CPT

## 2024-05-10 PROCEDURE — 99223 1ST HOSP IP/OBS HIGH 75: CPT | Performed by: INTERNAL MEDICINE

## 2024-05-10 PROCEDURE — 92610 EVALUATE SWALLOWING FUNCTION: CPT

## 2024-05-10 PROCEDURE — 84484 ASSAY OF TROPONIN QUANT: CPT | Performed by: INTERNAL MEDICINE

## 2024-05-10 PROCEDURE — 80053 COMPREHEN METABOLIC PANEL: CPT | Performed by: INTERNAL MEDICINE

## 2024-05-10 PROCEDURE — 70551 MRI BRAIN STEM W/O DYE: CPT

## 2024-05-10 PROCEDURE — 93010 ELECTROCARDIOGRAM REPORT: CPT | Performed by: INTERNAL MEDICINE

## 2024-05-10 PROCEDURE — 97161 PT EVAL LOW COMPLEX 20 MIN: CPT

## 2024-05-10 PROCEDURE — G0379 DIRECT REFER HOSPITAL OBSERV: HCPCS

## 2024-05-10 PROCEDURE — 80061 LIPID PANEL: CPT

## 2024-05-10 PROCEDURE — 93005 ELECTROCARDIOGRAM TRACING: CPT | Performed by: INTERNAL MEDICINE

## 2024-05-10 PROCEDURE — 93306 TTE W/DOPPLER COMPLETE: CPT

## 2024-05-10 PROCEDURE — 85025 COMPLETE CBC W/AUTO DIFF WBC: CPT | Performed by: INTERNAL MEDICINE

## 2024-05-10 PROCEDURE — 97165 OT EVAL LOW COMPLEX 30 MIN: CPT

## 2024-05-10 PROCEDURE — 95819 EEG AWAKE AND ASLEEP: CPT

## 2024-05-10 RX ORDER — ASPIRIN 325 MG
325 TABLET ORAL ONCE
Status: COMPLETED | OUTPATIENT
Start: 2024-05-10 | End: 2024-05-10

## 2024-05-10 RX ORDER — NITROGLYCERIN 0.4 MG/1
0.4 TABLET SUBLINGUAL
Status: DISCONTINUED | OUTPATIENT
Start: 2024-05-10 | End: 2024-05-11 | Stop reason: HOSPADM

## 2024-05-10 RX ORDER — ONDANSETRON 2 MG/ML
4 INJECTION INTRAMUSCULAR; INTRAVENOUS EVERY 6 HOURS PRN
Status: DISCONTINUED | OUTPATIENT
Start: 2024-05-10 | End: 2024-05-11 | Stop reason: HOSPADM

## 2024-05-10 RX ORDER — ROSUVASTATIN CALCIUM 20 MG/1
20 TABLET, COATED ORAL NIGHTLY
Status: DISCONTINUED | OUTPATIENT
Start: 2024-05-10 | End: 2024-05-11 | Stop reason: HOSPADM

## 2024-05-10 RX ORDER — SODIUM CHLORIDE 0.9 % (FLUSH) 0.9 %
10 SYRINGE (ML) INJECTION EVERY 12 HOURS SCHEDULED
Status: DISCONTINUED | OUTPATIENT
Start: 2024-05-10 | End: 2024-05-11 | Stop reason: HOSPADM

## 2024-05-10 RX ORDER — ASPIRIN 81 MG/1
81 TABLET, CHEWABLE ORAL DAILY
Status: DISCONTINUED | OUTPATIENT
Start: 2024-05-10 | End: 2024-05-10

## 2024-05-10 RX ORDER — SODIUM CHLORIDE 0.9 % (FLUSH) 0.9 %
10 SYRINGE (ML) INJECTION EVERY 12 HOURS SCHEDULED
Status: DISCONTINUED | OUTPATIENT
Start: 2024-05-10 | End: 2024-05-10

## 2024-05-10 RX ORDER — SODIUM CHLORIDE 9 MG/ML
40 INJECTION, SOLUTION INTRAVENOUS AS NEEDED
Status: DISCONTINUED | OUTPATIENT
Start: 2024-05-10 | End: 2024-05-11 | Stop reason: HOSPADM

## 2024-05-10 RX ORDER — HYDROCODONE BITARTRATE AND ACETAMINOPHEN 5; 325 MG/1; MG/1
1 TABLET ORAL EVERY 4 HOURS PRN
Status: DISCONTINUED | OUTPATIENT
Start: 2024-05-10 | End: 2024-05-10

## 2024-05-10 RX ORDER — ASPIRIN 300 MG/1
300 SUPPOSITORY RECTAL DAILY
Status: DISCONTINUED | OUTPATIENT
Start: 2024-05-10 | End: 2024-05-10

## 2024-05-10 RX ORDER — MORPHINE SULFATE 2 MG/ML
1 INJECTION, SOLUTION INTRAMUSCULAR; INTRAVENOUS EVERY 4 HOURS PRN
Status: DISCONTINUED | OUTPATIENT
Start: 2024-05-10 | End: 2024-05-10

## 2024-05-10 RX ORDER — ASPIRIN 300 MG/1
300 SUPPOSITORY RECTAL DAILY
Status: DISCONTINUED | OUTPATIENT
Start: 2024-05-11 | End: 2024-05-11 | Stop reason: HOSPADM

## 2024-05-10 RX ORDER — ACETAMINOPHEN 325 MG/1
650 TABLET ORAL EVERY 4 HOURS PRN
Status: DISCONTINUED | OUTPATIENT
Start: 2024-05-10 | End: 2024-05-11 | Stop reason: HOSPADM

## 2024-05-10 RX ORDER — SODIUM CHLORIDE 0.9 % (FLUSH) 0.9 %
10 SYRINGE (ML) INJECTION AS NEEDED
Status: DISCONTINUED | OUTPATIENT
Start: 2024-05-10 | End: 2024-05-11 | Stop reason: HOSPADM

## 2024-05-10 RX ORDER — ATORVASTATIN CALCIUM 40 MG/1
80 TABLET, FILM COATED ORAL NIGHTLY
Status: DISCONTINUED | OUTPATIENT
Start: 2024-05-10 | End: 2024-05-10

## 2024-05-10 RX ORDER — SODIUM CHLORIDE 9 MG/ML
40 INJECTION, SOLUTION INTRAVENOUS AS NEEDED
Status: DISCONTINUED | OUTPATIENT
Start: 2024-05-10 | End: 2024-05-10

## 2024-05-10 RX ORDER — NALOXONE HCL 0.4 MG/ML
0.4 VIAL (ML) INJECTION
Status: DISCONTINUED | OUTPATIENT
Start: 2024-05-10 | End: 2024-05-10

## 2024-05-10 RX ORDER — ASPIRIN 81 MG/1
81 TABLET, CHEWABLE ORAL DAILY
Status: DISCONTINUED | OUTPATIENT
Start: 2024-05-11 | End: 2024-05-11 | Stop reason: HOSPADM

## 2024-05-10 RX ORDER — SODIUM CHLORIDE 0.9 % (FLUSH) 0.9 %
10 SYRINGE (ML) INJECTION AS NEEDED
Status: DISCONTINUED | OUTPATIENT
Start: 2024-05-10 | End: 2024-05-10

## 2024-05-10 RX ADMIN — ROSUVASTATIN CALCIUM 20 MG: 20 TABLET, COATED ORAL at 20:20

## 2024-05-10 RX ADMIN — ROSUVASTATIN CALCIUM 20 MG: 20 TABLET, COATED ORAL at 02:01

## 2024-05-10 RX ADMIN — ASPIRIN 325 MG: 325 TABLET ORAL at 02:01

## 2024-05-10 RX ADMIN — Medication 10 ML: at 02:02

## 2024-05-10 RX ADMIN — Medication 10 ML: at 20:20

## 2024-05-10 NOTE — H&P
"    [facility] Medicine Services  HISTORY AND PHYSICAL    Patient Name: Emmanuel Whitten  : 1955  MRN: [id number]  Primary Care Physician: Keturah Ta MD  Date of admission: 5/10/2024      Subjective   Subjective     Chief Complaint:  Syncope    HPI:  Emmanuel Whitten is a 68 y.o. male transferred here from the [facility] during the overnight shift on Thursday night ().  He states that at around 815 earlier on Thursday night, he was outside the [employer] [place] smoking with a friend and had rather sudden onset of dizziness.  He started to walk back inside the [place], and he states the next thing he knows \"the world was really spinning,\" and he then only remembers awakening in the ER in [place].  On arrival to the hospital in [place], the ED note from there mentions the patient had witnessed seizure-like activity for approximately 5 minutes, but there is no further description of this; the patient has no recollection of this.  The ED note also mentions the patient had left-sided fixed gaze and could not talk or move his left side.  The patient confirms that he recalls having \"total numbness\" of the left side of his body, as well as left-sided hemiparesis.  He states these left-sided symptoms lasted for approximately 1 hour, and have resolved and have not recurred.  He states he occasionally has episodes of very mild dizziness and lightheadedness 20-30 minutes after eating, but has never had dizziness as bad as earlier tonight, and has never suffered a syncopal episode.  He does confirm that before the episode tonight, he had eaten dinner approximately 30 minutes prior.    He currently denies any dizziness/lightheadedness, headache, visual changes, slurred speech/facial droop, focal weakness, bowel habit change, chest pain, abdominal pain, or syncope.  His medical history is significant for hypothyroidism, history of cervical spine fracture, prostate cancer s/p CyberKnife, " COPD for which he states he requires no treatment, and hyperlipidemia (he does not take a statin due to increased myalgia when previously on this class of medication).  He has history of herpes zoster in the right eye for which he takes as needed treatment.  He also states he had surgery on the left side of his face and this has caused him to have some slight left-sided mouth asymmetry and tight skin which he states is chronic.      Personal History     Past Medical History:   Diagnosis Date   • Disease of thyroid gland    • H/O cervical fracture     was in [place] for 6 weeks   • Prostate cancer    • Psoriasis    • Skin cancer 2018    basal cell right neck         Oncology Problem List:  Prostate cancer (11/01/2018; Status: Active)  Oncology/Hematology History   Prostate cancer   11/1/2018 Initial Diagnosis    Prostate cancer (CMS/HCC)     1/7/2019 - 1/11/2019 Radiation    Radiation OncologyTreatment Course:  Emmanuel Whitten received 3500 cGy in 5 fractions to prostate via Stereotactic Radiation Therapy - SRT.       Past Surgical History:   Procedure Laterality Date   • CARDIAC CATHETERIZATION  2013    x 3 all clean   • CHOLECYSTECTOMY  2003   • COLONOSCOPY  2017   • CYBERKNIFE  01/11/2019    prostate   • CYST REMOVAL      right wrist   • FOOT FRACTURE SURGERY     • KNEE ARTHROSCOPY Left 1988   • MOHS SURGERY  2018   • NOSE SURGERY  1995   • PAROTIDECTOMY  2015   • PROSTATE BIOPSY  2018       Family History: family history includes Coronary artery disease in his father; Liver cancer in his sister; Ovarian cancer in his mother.     Social History:  reports that he has been smoking cigarettes. He started smoking about 66 years ago. He has a 66.4 pack-year smoking history. He has never used smokeless tobacco. He reports current alcohol use of about 1.0 standard drink of alcohol per week. He reports that he does not use drugs.  Social History     Social History Narrative   • Not on file       Medications:  Available  home medication information reviewed.  Famciclovir, levothyroxine, and prednisoLONE acetate    No Known Allergies    Objective   Objective     Vital Signs:   Temp:  [98.2 °F (36.8 °C)] 98.2 °F (36.8 °C)  Resp:  [18] 18  BP: (121)/(75) 121/75  Total (NIH Stroke Scale): 5    Physical Exam   Constitutional: Awake, alert, NAD, pleasant.  Eyes: PERRLA, sclerae anicteric, no conjunctival injection  HENT: NCAT, mucous membranes moist  Neck: Supple, no thyromegaly, no lymphadenopathy, trachea midline  Respiratory: Clear to auscultation bilaterally, nonlabored respirations   Cardiovascular: RRR, 1/6 systolic murmur, no rubs or gallops, palpable pedal pulses bilaterally  Gastrointestinal: Positive bowel sounds, soft, nontender, nondistended  Musculoskeletal: No bilateral ankle edema, no clubbing or cyanosis to extremities  Psychiatric: Appropriate affect, cooperative  Neurologic: Oriented x 3, strength symmetric in all extremities, Cranial Nerves grossly intact to confrontation, there is minuscule left-sided mouth droop/asymmetry which the patient states is chronic and unchanged from his baseline.  Speech clear.  Skin: No rashes, normal turgor.    Result Review:  I have personally reviewed the results from the time of this admission to 5/10/2024 01:11 EDT and agree with these findings:  [x]  Laboratory list / accordion  []  Microbiology  [x]  Radiology  [x]  EKG/Telemetry   []  Cardiology/Vascular   []  Pathology  []  Old records  []  Other:  Most notable findings include: Awaiting labs from arrival here.  Reviewed radiology report from outside hospital and his accompanying paperwork, which mentions atherosclerotic plaques in the right internal carotid artery causing mild stenosis and left internal carotid artery causing moderate stenosis.  CT head without contrast there mentions no acute intracranial abnormality on the radiology report.  Awaiting EKG here.      LAB RESULTS:                                  Microbiology  Results (last 10 days)       ** No results found for the last 240 hours. **            CT Outside Films    Result Date: 5/10/2024  This procedure was auto-finalized with no dictation required.         Assessment & Plan   Assessment & Plan       Syncope      68 M with syncopal episode, possible seizure activity, concern for CVA    Syncope  Seizure activity, possible  Concern for CVA  - Daily aspirin and statin.  - HbA1c/lipid panel; PT/OT/SLP; neuro checks; 2D echo; MRI brain.  - Remainder of CVA workup per neurology team, will follow up their recommendations.  - Continue telemetry.  - Check a.m. troponin.  - Check EKG.  - Seizure precautions.  - N.p.o. until bedside swallow eval is passed.  - EEG.  - May require postprandial fingerstick glucose checks, given his stated history (see HPI).    Hypothyroidism  - Continue Synthroid from home regimen.  - Check TSH.    Hyperlipidemia  - Does not take a statin at home due to myalgia, will be on low-dose statin here per neurology service.  - Lipid panel with a.m. labs.    COPD  - Saturations currently good on room air, not currently in exacerbation.  - He states he requires no treatment for this, never experiences any dyspnea.    History of right eye herpes zoster  - Will continue home prednisolone eyedrops as needed.    Prostate cancer, history of  - S/p CyberKnife treatment here, he states treatment for this is complete, no acute issues.          Total time spent: 77 minutes  Time spent includes time reviewing chart, face-to-face time, counseling patient/family/caregiver, ordering medications/tests/procedures, communicating with other health care professionals, documenting clinical information in the electronic health record, and coordination of care.       DVT prophylaxis:  Mechanical DVT prophylaxis orders are present.          CODE STATUS: Full  Code Status and Medical Interventions:   Ordered at: 05/10/24 0109     Level Of Support Discussed With:    Patient     Code  Status (Patient has no pulse and is not breathing):    CPR (Attempt to Resuscitate)     Medical Interventions (Patient has pulse or is breathing):    Full Support       Expected Discharge   TBD    Mason Guadalupe,III, DO  05/10/24

## 2024-05-10 NOTE — PLAN OF CARE
Goal Outcome Evaluation:  Plan of Care Reviewed With: patient, spouse           Outcome Evaluation: Pt presents at baseline for functional mobility tasks. No orthostatic drop in BP during evaluation. No deficits identified requiring PT services. Rec home upon dc.      Anticipated Discharge Disposition (PT): home

## 2024-05-10 NOTE — PLAN OF CARE
Goal Outcome Evaluation:  Plan of Care Reviewed With: patient, spouse        Progress: no change  Outcome Evaluation: Pt presents at baseline for ADL completion and related mobility with symmetrical BUE strength and coordination/sensation intact. BP stable during positional changes, mitigates R eye blindness (baseline) well. OT signing off, please reconsult if needed. Rec d/c to home when medically appropriately.      Anticipated Discharge Disposition (OT): home

## 2024-05-10 NOTE — THERAPY DISCHARGE NOTE
Acute Care - Occupational Therapy Discharge  [facility]    Patient Name: Emmanuel Whitten  : 1955    MRN: [id number]                              Today's Date: 5/10/2024       Admit Date: 5/10/2024    Visit Dx: No diagnosis found.  Patient Active Problem List   Diagnosis    Prostate cancer    Syncope     Past Medical History:   Diagnosis Date    Disease of thyroid gland     H/O cervical fracture     was in [place] for 6 weeks    Prostate cancer     Psoriasis     Skin cancer 2018    basal cell right neck     Past Surgical History:   Procedure Laterality Date    CARDIAC CATHETERIZATION  2013    x 3 all clean    CHOLECYSTECTOMY  2003    COLONOSCOPY  2017    CYBERKNIFE  2019    prostate    CYST REMOVAL      right wrist    FOOT FRACTURE SURGERY      KNEE ARTHROSCOPY Left 1988    MOHS SURGERY  2018    NOSE SURGERY  1995    PAROTIDECTOMY      PROSTATE BIOPSY        General Information       Row Name 05/10/24 1312          OT Time and Intention    Document Type discharge evaluation/summary  -CS     Mode of Treatment occupational therapy  -CS       Row Name 05/10/24 1312          General Information    Patient Profile Reviewed yes  -CS     Prior Level of Function independent:;all household mobility;ADL's  no AD/DME reported for ADLs or related mobility  -CS     Existing Precautions/Restrictions no known precautions/restrictions  R eye blind (baseline)  -CS     Barriers to Rehab visual deficit  -CS       Row Name 05/10/24 1312          Living Environment    People in Home spouse  -CS       Row Name 05/10/24 1312          Home Main Entrance    Number of Stairs, Main Entrance two  -CS     Stair Railings, Main Entrance none  -CS       Row Name 05/10/24 1312          Stairs Within Home, Primary    Number of Stairs, Within Home, Primary none  -CS       Row Name 05/10/24 1312          Cognition    Orientation Status (Cognition) oriented x 4  -CS       Row Name 05/10/24 1312          Safety Issues, Functional  Mobility    Impairments Affecting Function (Mobility) visual/perceptual  -CS     Comment, Safety Issues/Impairments (Mobility) R eye blind (baseline)  -CS               User Key  (r) = Recorded By, (t) = Taken By, (c) = Cosigned By      Initials Name Provider Type    CS David Ybarra, OT Occupational Therapist                   Mobility/ADL's       Row Name 05/10/24 1315          Transfers    Transfers bed-chair transfer;toilet transfer  -CS     Comment, (Transfers) no dizziness reported, BP stable, verbalized good self-awareness with body scan technique used at baseline for safety during supine-sit  -CS       Row Name 05/10/24 1315          Bed-Chair Transfer    Bed-Chair [place] (Transfers) independent  -CS       Row Name 05/10/24 1315          Toilet Transfer    Type (Toilet Transfer) sit-stand;stand-sit  -     [place] Level (Toilet Transfer) independent  -CS       Row Name 05/10/24 1315          Functional Mobility    Functional Mobility- Comment Supervision for all in-room and hallway mobility, no LOB or AD  -CS     Patient was able to Ambulate yes  -CS       Row Name 05/10/24 1315          Activities of Daily Living    BADL Assessment/Intervention lower body dressing;feeding;toileting  -CS       Row Name 05/10/24 1315          Lower Body Dressing Assessment/Training    [place]ruff Level (Lower Body Dressing) don;socks;independent  -CS     Position (Lower Body Dressing) edge of bed sitting  -CS       Row Name 05/10/24 1315          Grooming Assessment/Training    [place] Level (Grooming) grooming skills;independent  -CS     Position (Grooming) sink side  -CS       Row Name 05/10/24 1315          Self-Feeding Assessment/Training    [place] Level (Feeding) feeding skills;independent  -CS     Position (Self-Feeding) supported sitting  -CS       Row Name 05/10/24 1315          Toileting Assessment/Training    [place] Level (Toileting) toileting skills;independent  -CS                User Key  (r) = Recorded By, (t) = Taken By, (c) = Cosigned By      Initials Name Provider Type    CS David Ybarra OT Occupational Therapist                   Obj/Interventions       [facility] Name 05/10/24 1316          Sensory Assessment (Somatosensory)    Sensory Assessment (Somatosensory) bilateral UE  -CS     Bilateral UE Sensory Assessment general sensation;light touch awareness;light touch localization;intact  -[facility] Name 05/10/24 1316          Vision Assessment/Intervention    Visual Impairment/Limitations visual/perceptual impairments present;peripheral vision impaired right  -     Vision Assessment Comment R eye blind  -[facility] Name 05/10/24 1316          Range of Motion Comprehensive    General Range of Motion no range of motion deficits identified  -[facility] Name 05/10/24 1316          Strength Comprehensive (MMT)    General Manual Muscle Testing (MMT) Assessment no strength deficits identified  -     Comment, General Manual Muscle Testing (MMT) Assessment 5/5, symmetrical  -[facility] Name 05/10/24 1316          Motor Skills    Motor Skills coordination;functional endurance  -     Coordination bilateral;upper extremity;finger to nose;bimanual skills;WFL  -     Functional Endurance good  -CS     Therapeutic Exercise shoulder  -[facility] Name 05/10/24 1316          Balance    Balance Assessment sitting static balance;sitting dynamic balance;standing static balance;standing dynamic balance  -CS     Static Sitting Balance independent  -CS     Dynamic Sitting Balance independent  -CS     Position, Sitting Balance unsupported;sitting edge of bed  -CS     Static Standing Balance independent  -CS     Dynamic Standing Balance independent  -CS     Position/Device Used, Standing Balance unsupported  -CS     Balance Interventions sitting;sit to stand;standing;dynamic;occupation based/functional task  -CS     Comment, Balance no LOB during ADLs with dynamic challenge  -                User Key  (r) = Recorded By, (t) = Taken By, (c) = Cosigned By      Initials Name Provider Type    CS David Ybarra, OT Occupational Therapist                   Goals/Plan    No documentation.                  Clinical Impression       Row Name 05/10/24 1317          Pain Assessment    Pretreatment Pain Rating 0/10 - no pain  -CS     Posttreatment Pain Rating 0/10 - no pain  -CS       Row Name 05/10/24 1317          Plan of Care Review    Plan of Care Reviewed With patient;spouse  -CS     Progress no change  -CS     Outcome Evaluation Pt presents at baseline for ADL completion and related mobility with symmetrical BUE strength and coordination/sensation intact. BP stable during positional changes, mitigates R eye blindness (baseline) well. OT signing off, please reconsult if needed. Rec d/c to home when medically appropriately.  -CS       Row Name 05/10/24 1317          Therapy Assessment/Plan (OT)    Rehab Potential (OT) --  -CS     Criteria for Skilled Therapeutic Interventions Met (OT) no;does not meet criteria for skilled intervention  -CS     Therapy Frequency (OT) evaluation only  -CS       Row Name 05/10/24 1317          Therapy Plan Review/Discharge Plan (OT)    Anticipated Discharge Disposition (OT) home  -CS       Row Name 05/10/24 1317          Vital Signs    Pre Systolic BP Rehab 120  -CS     Pre Treatment Diastolic BP 58  -CS     Post Systolic BP Rehab 118  -CS     Post Treatment Diastolic BP 89  -CS     O2 Delivery Pre Treatment room air  -CS     O2 Delivery Intra Treatment room air  -CS     O2 Delivery Post Treatment room air  -CS     Pre Patient Position Supine  -CS     Intra Patient Position Standing  -CS     Post Patient Position Sitting  -CS       Row Name 05/10/24 1317          Positioning and Restraints    Pre-Treatment Position in bed  -CS     Post Treatment Position chair  -CS     In Chair notified nsg;reclined;sitting;call light within reach;encouraged to call for assist;exit alarm on;legs  elevated  -CS               User Key  (r) = Recorded By, (t) = Taken By, (c) = Cosigned By      Initials Name Provider Type    CS David Ybarra, OT Occupational Therapist                   Outcome Measures       Row Name 05/10/24 1319          How much help from another is currently needed...    Putting on and taking off regular lower body clothing? 4  -CS     Bathing (including washing, rinsing, and drying) 4  -CS     Toileting (which includes using toilet bed pan or urinal) 4  -CS     Putting on and taking off regular upper body clothing 4  -CS     Taking care of personal grooming (such as brushing teeth) 4  -CS     Eating meals 4  -CS     AM-PAC 6 Clicks Score (OT) 24  -CS       Row Name 05/10/24 1112          How much help from another person do you currently need...    Turning from your back to your side while in flat bed without using bedrails? 4  -KR     Moving from lying on back to sitting on the side of a flat bed without bedrails? 4  -KR     Moving to and from a bed to a chair (including a wheelchair)? 4  -KR     Standing up from a chair using your arms (e.g., wheelchair, bedside chair)? 4  -KR     Climbing 3-5 steps with a railing? 4  -KR     To walk in hospital room? 4  -KR     AM-PAC 6 Clicks Score (PT) 24  -KR     Highest Level of Mobility Goal 8 --> Walked 250 feet or more  -KR       Row Name 05/10/24 1319 05/10/24 1112       Modified Ada Scale    Pre-Stroke Modified Radha Scale -- 6 - Unable to determine (UTD) from the medical record documentation  -KR    Modified Ada Scale 0 - No Symptoms at all.  -CS 0 - No Symptoms at all.  -KR      Row Name 05/10/24 1319 05/10/24 1112       Functional Assessment    Outcome Measure Options AM-PAC 6 Clicks Daily Activity (OT);Modified Radha  -CS Modified Ada  -KR              User Key  (r) = Recorded By, (t) = Taken By, (c) = Cosigned By      Initials Name Provider Type    CS David Ybarra, OT Occupational Therapist    KR Noelle Avelar, PT  Physical Therapist                  Occupational Therapy Education       Title: PT OT SLP Therapies (Done)       Topic: Occupational Therapy (Done)       Point: Precautions (Done)       Description:   Instruct learner(s) on prescribed precautions during self-care and functional transfers.                  Learning Progress Summary             Patient Acceptance, E,D, VU,DU by  at 5/10/2024 1320    Comment: in-room safety awareness   Family Acceptance, E,D, VU,DU by  at 5/10/2024 1320    Comment: in-room safety awareness                                         User Key       Initials Effective Dates Name Provider Type Discipline     06/16/21 -  David Ybarra OT Occupational Therapist OT                  OT Recommendation and Plan  Therapy Frequency (OT): evaluation only  Plan of Care Review  Plan of Care Reviewed With: patient, spouse  Progress: no change  Outcome Evaluation: Pt presents at baseline for ADL completion and related mobility with symmetrical BUE strength and coordination/sensation intact. BP stable during positional changes, mitigates R eye blindness (baseline) well. OT signing off, please reconsult if needed. Rec d/c to home when medically appropriately.  Plan of Care Reviewed With: patient, spouse  Outcome Evaluation: Pt presents at baseline for ADL completion and related mobility with symmetrical BUE strength and coordination/sensation intact. BP stable during positional changes, mitigates R eye blindness (baseline) well. OT signing off, please reconsult if needed. Rec d/c to home when medically appropriately.     Time Calculation:   Evaluation Complexity (OT)  Review Occupational Profile/Medical/Therapy History Complexity: brief/low complexity  Assessment, Occupational Performance/Identification of Deficit Complexity: 1-3 performance deficits  Clinical Decision Making Complexity (OT): problem focused assessment/low complexity  Overall Complexity of Evaluation (OT): low complexity     Time  Calculation- OT       Row Name 05/10/24 1320             Time Calculation- OT    OT Start Time 1100  -CS      OT Received On 05/10/24  -CS         Untimed Charges    OT Eval/Re-eval Minutes 36  -CS         Total Minutes    Untimed Charges Total Minutes 36  -CS       Total Minutes 36  -CS                User Key  (r) = Recorded By, (t) = Taken By, (c) = Cosigned By      Initials Name Provider Type    CS David Ybarra, VICTOR HUGO Occupational Therapist                  Therapy Charges for Today       Code Description Service Date Service Provider Modifiers Qty    [id number]  OT EVAL LOW COMPLEXITY 3 5/10/2024 David Ybarra OT GO 1               OT Discharge Summary  Anticipated Discharge Disposition (OT): home  Reason for Discharge: At baseline function, Independent  Discharge Destination: Home    David Ybarra OT  5/10/2024

## 2024-05-10 NOTE — THERAPY DISCHARGE NOTE
Patient Name: Emmanuel Whitten  : 1955    MRN: [id number]                              Today's Date: 5/10/2024       Admit Date: 5/10/2024    Visit Dx: No diagnosis found.  Patient Active Problem List   Diagnosis    Prostate cancer    Syncope     Past Medical History:   Diagnosis Date    Disease of thyroid gland     H/O cervical fracture     was in [facility] for 6 weeks    Prostate cancer     Psoriasis     Skin cancer 2018    basal cell right neck     Past Surgical History:   Procedure Laterality Date    CARDIAC CATHETERIZATION  2013    x 3 all clean    CHOLECYSTECTOMY  2003    COLONOSCOPY  2017    CYBERKNIFE  2019    prostate    CYST REMOVAL      right wrist    FOOT FRACTURE SURGERY      KNEE ARTHROSCOPY Left 1988    MOHS SURGERY  2018    NOSE SURGERY  1995    PAROTIDECTOMY  2015    PROSTATE BIOPSY        General Information       Row Name 05/10/24 1108          Physical Therapy Time and Intention    Document Type discharge evaluation/summary  -KR     Mode of Treatment physical therapy  -KR       Row Name 05/10/24 1108          General Information    Patient Profile Reviewed yes  -KR     Prior Level of Function independent:;all household mobility;community mobility;ADL's  -KR     Existing Precautions/Restrictions no known precautions/restrictions  -KR     Barriers to Rehab visual deficit  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  -KR       Row Name 05/10/24 1108          Living Environment    People in Home spouse  -KR       Row Name 05/10/24 1108          Home Main Entrance    Number of Stairs, Main Entrance two  -KR     Stair Railings, Main Entrance none  -KR       Row Name 05/10/24 1108          Stairs Within Home, Primary    Number of Stairs, Within Home, Primary none  -KR       Row Name 05/10/24 1108          Cognition    Orientation Status (Cognition) oriented x 4  -KR       Row Name 05/10/24 1108          Safety Issues, Functional Mobility    Impairments Affecting Function (Mobility) visual/perceptual  -KR                User Key  (r) = Recorded By, (t) = Taken By, (c) = Cosigned By      Initials Name Provider Type    Noelle Lozano PT Physical Therapist                   Mobility       Row Name 05/10/24 1109          Sit-Stand Transfer    Sit-Stand [place] (Transfers) independent  -KR     Comment, (Sit-Stand Transfer) 1x from bed  -KR       Row Name 05/10/24 1109          Gait/Stairs (Locomotion)    [place] Level (Gait) independent  -KR     Distance in Feet (Gait) 120  120' x 5  -KR     [place] Level (Stairs) independent  -KR     Handrail Location (Stairs) none  -KR     Number of Steps (Stairs) 2  -KR     Ascending Technique (Stairs) step-over-step  -KR     Descending Technique (Stairs) step-over-step  -KR     Comment, (Gait/Stairs) pt participated in FGA. See outcome measures for more information.  -KR               User Key  (r) = Recorded By, (t) = Taken By, (c) = Cosigned By      Initials Name Provider Type    Noelle Lozano PT Physical Therapist                   Obj/Interventions       Row Name 05/10/24 1110          Range of Motion Comprehensive    General Range of Motion no range of motion deficits identified  -KR       Row Name 05/10/24 1110          Strength Comprehensive (MMT)    General Manual Muscle Testing (MMT) Assessment no strength deficits identified  -KR       Row Name 05/10/24 1110          Balance    Balance Assessment sitting static balance;sitting dynamic balance;standing static balance;standing dynamic balance  -KR     Static Sitting Balance independent  -KR     Dynamic Sitting Balance independent  -KR     Position, Sitting Balance unsupported;sitting in chair  -KR     Static Standing Balance independent  -KR     Dynamic Standing Balance independent  -KR     Position/Device Used, Standing Balance unsupported  -KR     Balance Interventions sitting;standing;sit to stand;static;dynamic  -KR       Row Name 05/10/24 1110          Sensory Assessment (Somatosensory)    Sensory  Assessment (Somatosensory) LE sensation intact  -KR               User Key  (r) = Recorded By, (t) = Taken By, (c) = Cosigned By      Initials Name Provider Type    Noelle Lozano, PT Physical Therapist                   Goals/Plan    No documentation.                  Clinical Impression       Row Name 05/10/24 1110          Pain    Pretreatment Pain Rating 0/10 - no pain  -KR     Posttreatment Pain Rating 0/10 - no pain  -KR       Row Name 05/10/24 1110          Plan of Care Review    Plan of Care Reviewed With patient;spouse  -KR     Outcome Evaluation Pt presents at baseline for functional mobility tasks. No orthostatic drop in BP during evaluation. No deficits identified requiring PT services. Rec home upon dc.  -KR       Row Name 05/10/24 1110          Therapy Assessment/Plan (PT)    Criteria for Skilled Interventions Met (PT) no;no problems identified which require skilled intervention  -KR       [facility] Name 05/10/24 1110          Vital Signs    Pre Systolic BP Rehab 120  -KR     Pre Treatment Diastolic BP 58  -KR     Post Systolic BP Rehab 118  -KR     Post Treatment Diastolic BP 89  -KR     Pre Patient Position Sitting  -KR     Intra Patient Position Standing  -KR     Post Patient Position Sitting  -KR       Row Name 05/10/24 1110          Positioning and Restraints    Pre-Treatment Position in bed  -KR     Post Treatment Position bathroom  -KR     Bathroom with OT;sitting;with family/caregiver  -KR               User Key  (r) = Recorded By, (t) = Taken By, (c) = Cosigned By      Initials Name Provider Type    Noelle Lozano, PT Physical Therapist                   Outcome Measures       Row Name 05/10/24 1112 05/10/24 0122       How much help from another person do you currently need...    Turning from your back to your side while in flat bed without using bedrails? 4  -KR 4  -LA    Moving from lying on back to sitting on the side of a flat bed without bedrails? 4  -KR 4  -LA    Moving to and from a  bed to a chair (including a wheelchair)? 4  -KR 4  -LA    Standing up from a chair using your arms (e.g., wheelchair, bedside chair)? 4  -KR 4  -LA    Climbing 3-5 steps with a railing? 4  -KR 3  -LA    To walk in hospital room? 4  -KR 3  -LA    AM-PAC 6 Clicks Score (PT) 24  -KR 22  -LA    Highest Level of Mobility Goal 8 --> Walked 250 feet or more  -KR 7 --> Walk 25 feet or more  -LA      Row Name 05/10/24 1112          Modified Radha Scale    Pre-Stroke Modified [place]ha Scale 6 - Unable to determine (UTD) from the medical record documentation  -KR     Modified [place] Scale 0 - No Symptoms at all.  -KR       Row Name 05/10/24 1112          Functional Assessment    Outcome Measure Options Modified [place]  -KR               User Key  (r) = Recorded By, (t) = Taken By, (c) = Cosigned By      Initials Name Provider Type    Desiree Rehman, RN Registered Nurse    Noelle Lozano, PT Physical Therapist                    PT Recommendation and Plan     Plan of Care Reviewed With: patient, spouse  Outcome Evaluation: Pt presents at baseline for functional mobility tasks. No orthostatic drop in BP during evaluation. No deficits identified requiring PT services. Rec home upon dc.     Time Calculation:   PT Evaluation Complexity  History, PT Evaluation Complexity: 3 or more personal factors and/or comorbidities  Examination of Body Systems (PT Eval Complexity): total of 4 or more elements  Clinical Presentation (PT Evaluation Complexity): stable  Clinical Decision Making (PT Evaluation Complexity): low complexity  Overall Complexity (PT Evaluation Complexity): low complexity     PT Charges       Row Name 05/10/24 1113             Time Calculation    Start Time 1053  -KR      PT Received On 05/10/24  -KR         Untimed Charges    PT Eval/Re-eval Minutes 31  -KR         Total Minutes    Untimed Charges Total Minutes 31  -KR       Total Minutes 31  -KR                User Key  (r) = Recorded By, (t) = Taken By, (c) =  Cosigned By      Initials Name Provider Type    KR Noelle Avelar, PT Physical Therapist                  Therapy Charges for Today       Code Description Service Date Service Provider Modifiers Qty    28063333424 HC PT EVAL LOW COMPLEXITY 3 5/10/2024 Noelle Avelar, PT GP 1            PT G-Codes  Outcome Measure Options: Modified [place]  AM-PAC 6 Clicks Score (PT): 24  Modified [place] Scale: 0 - No Symptoms at all.    PT Discharge Summary  Anticipated Discharge Disposition (PT): home  Reason for Discharge: At baseline function, Independent    Noelle Avelar, PT  5/10/2024

## 2024-05-10 NOTE — CONSULTS
Stroke Consult Note    Patient Name: Emmanuel Whitten   MRN: [id number]  Age: 68 y.o.  Sex: male  : 1955    Primary Care Physician: Keturah Ta MD  Referring Physician:  Dr. Guadalupe    TIME STROKE TEAM CALLED: 0028 EST     TIME PATIENT SEEN: 0055 EST    Handedness: Right  Race: White     Chief Complaint/Reason for Consultation: Left-sided weakness and paresthesias    HPI: Mr. Whitten is a 68-year-old male with PMH of hypothyroidism and prostate cancer.  He presented to [facility] ER by [facility] after a witnessed syncopal episode with seizure-like activity for approximately 5 minutes.  Presentation to ER included left gaze preference, left-sided paralysis and paresthesias.  Initial NIH of 15 in the ER.  CT head/CTA head and neck/CT cerebral perfusion negative for infarct or occlusion.  ER provider reports presenting symptoms have now resolved and NIH is 0.  He was transferred to [facility] for higher level care and further stroke workup.    Of note, patient mentions several episodes of dizziness each week for the past month.  No reported falls, however, he has stumbled over his feet.  Mr. Whitten states these episodes last for several minutes and eating food seems to help relieve symptoms.    Initial NIH 6.  Blood pressure 121/75.  On exam patient is able answer questions appropriately and follow two-step commands.  Decrease sensitivity to left-sided body.  No headache or nausea.  Patient did have a syncopal episode with LOC today (2024) at 2015.  He does not remember hitting his head.  Slight left facial droop without speech difficulty.  Patient is blind in right eye and can only see shadows.  Strength left lower extremity 4/5.  Ataxia noted to the left lower extremity.  Current everyday smoker and no EtOH use.  He does not take his prescribed medications routinely.    Last Known Normal Date/Time: 2024 at 2000 EST     Review of Systems   Constitutional:  Negative for fever.   HENT:   Negative for trouble swallowing and voice change.    Eyes:  Positive for visual disturbance (Chronic right eye blindness).   Respiratory:  Negative for shortness of breath.    Cardiovascular:  Negative for chest pain.   Gastrointestinal:  Negative for abdominal pain and nausea.   Neurological:  Positive for syncope, facial asymmetry, speech difficulty, weakness and numbness. Negative for dizziness, light-headedness and headaches.   Psychiatric/Behavioral:  Negative for confusion.       Past Medical History:   Diagnosis Date    Disease of thyroid gland     H/O cervical fracture     was in [facility] for 6 weeks    Prostate cancer     Psoriasis     Skin cancer 2018    basal cell right neck     Past Surgical History:   Procedure Laterality Date    CARDIAC CATHETERIZATION  2013    x 3 all clean    CHOLECYSTECTOMY  2003    COLONOSCOPY  2017    CYBERKNIFE  01/11/2019    prostate    CYST REMOVAL      right wrist    FOOT FRACTURE SURGERY      KNEE ARTHROSCOPY Left 1988    MOHS SURGERY  2018    NOSE SURGERY  1995    PAROTIDECTOMY  2015    PROSTATE BIOPSY  2018     Family History   Problem Relation Age of Onset    Ovarian cancer Mother     Coronary artery disease Father     Liver cancer Sister      Social History     Socioeconomic History    Marital status:    Tobacco Use    Smoking status: Every Day     Current packs/day: 1.00     Average packs/day: 1 pack/day for 66.4 years (66.4 ttl pk-yrs)     Types: Cigarettes     Start date: 1958    Smokeless tobacco: Never   Vaping Use    Vaping status: Never Used   Substance and Sexual Activity    Alcohol use: Yes     Alcohol/week: 1.0 standard drink of alcohol     Types: 1 Cans of beer per week     Comment: 2 drinks per year    Drug use: No    Sexual activity: Defer     No Known Allergies  Prior to Admission medications    Medication Sig Start Date End Date Taking? Authorizing Provider   levothyroxine (SYNTHROID, LEVOTHROID) 175 MCG tablet levothyroxine 175 mcg tablet   Yes  Provider, MD Vianca   prednisoLONE acetate (PRED FORTE) 1 % ophthalmic suspension prednisolone acetate 1 % eye drops,suspension   One drop OD 1 x day PRN   Yes Vianca Suarez MD   Famciclovir (FAMVIR PO) Famvir   once q day    Provider, MD Vianca         Temp:  [98.2 °F (36.8 °C)] 98.2 °F (36.8 °C)  Resp:  [18] 18  BP: (121)/(75) 121/75  Neurological Exam  Mental Status  Awake, alert and oriented to person, place and time. Oriented to person, place and time. Oriented to person, place, and time. Speech is normal. Language is fluent with no aphasia.    Cranial Nerves  CN II: Vision test: PERRLA left eye only.  Chronic blindness right eye.. Right visual acuity: Normal. Chronic blindness to right eye patient can see shadows..  CN III, IV, VI: Extraocular movements intact bilaterally. Pupils equal round and reactive to light bilaterally. PERRLA left eye only.  CN V:  Right: Facial sensation is normal.  Left: Diminished sensation of the entire left side of the face.  CN VII:  Left: There is central facial weakness.  CN VIII: Hard of hearing.  Patient wears hearing aids..  CN XI: Shoulder shrug strength is normal.  CN XII: Tongue midline without atrophy or fasciculations.    Motor  Normal muscle bulk throughout. Normal muscle tone. Strength is 5/5 in all four extremities except as noted.  Strength left lower extremity 4/5..    Sensory  Light touch abnormality: Sensation: Decrease sensitivity left side of body.     Coordination  Right: Finger-to-nose normal. Heel-to-shin normal.Left: Finger-to-nose normal. Heel-to-shin abnormality:  Ataxia left lower extremity.    Gait    Unable to assess.      Physical Exam  Constitutional:       General: He is not in acute distress.     Appearance: Normal appearance.   HENT:      Head: Normocephalic and atraumatic.      Nose: Nose normal.      Mouth/Throat:      Mouth: Mucous membranes are dry.   Eyes:      Extraocular Movements: Extraocular movements intact.      Pupils:  Pupils are equal, round, and reactive to light.      Comments: PERRLA left eye only.  Chronic blindness right eye.   Cardiovascular:      Pulses: Normal pulses.   Pulmonary:      Effort: Pulmonary effort is normal.   Abdominal:      General: Abdomen is flat.   Musculoskeletal:         General: Normal range of motion.      Cervical back: Normal range of motion.   Skin:     General: Skin is warm and dry.   Neurological:      Mental Status: He is oriented to person, place, and time.      Cranial Nerves: Cranial nerve deficit present.      Sensory: Sensory deficit present.      Motor: Weakness present.      Coordination: Coordination abnormal.   Psychiatric:         Mood and Affect: Mood normal.         Speech: Speech normal.         Behavior: Behavior normal.         Acute Stroke Data    Thrombolytic Inclusion / Exclusion Criteria    Time: 01:23 EDT  Person Administering Scale: RENAN Moncada    Inclusion Criteria  [x]   18 years of age or greater   []   Onset of symptoms < 4.5 hours before beginning treatment (stroke onset = time patient was last seen well or without symptoms).   []   Diagnosis of acute ischemic stroke causing measurable disabling deficit (Complete Hemianopia, Any Aphasia, Visual or Sensory Extinction, Any weakness limiting sustained effort against gravity)   []   Any remaining deficit considered potentially disabling in view of patient and practitioner   Exclusion criteria (Do not proceed with Alteplase if any are checked under exclusion criteria)  [x]   Onset unknown or GREATER than 4.5 hours   []   ICH on CT/MRI   []   CT demonstrates hypodensity representing acute or subacute infarct   []   Significant head trauma or prior stroke in the previous 3 months   []   Symptoms suggestive of subarachnoid hemorrhage   []   History of un-ruptured intracranial aneurysm GREATER than 10 mm   []   Recent intracranial or intraspinal surgery within the last 3 months   []   Arterial puncture at a  non-compressible site in the previous 7 days   []   Active internal bleeding   []   Acute bleeding tendency   []   Platelet count LESS than 100,000 for known hematological diseases such as leukemia, thrombocytopenia or chronic cirrhosis   []   Current use of anticoagulant with INR GREATER than 1.7 or PT GREATER than 15 seconds, aPTT GREATER than 40 seconds   []   Heparin received within 48 hours, resulting in abnormally elevated aPTT GREATER than upper limit of normal   []   Current use of direct thrombin inhibitors or direct factor Xa inhibitors in the past 48 hours   []   Elevated blood pressure refractory to treatment (systolic GREATER than 185 mm/Hg or diastolic  GREATER than 110 mm/Hg   []   Suspected infective endocarditis and aortic arch dissection   []   Current use of therapeutic treatment dose of low-molecular-weight heparin (LMWH) within the previous 24 hours   []   Structural GI malignancy or bleed   Relative exclusion for all patients  []   Only minor non-disabling symptoms   []   Pregnancy   []   Seizure at onset with postictal residual neurological impairments   []   Major surgery or previous trauma within past 14 days   []   History of previous spontaneous ICH, intracranial neoplasm, or AV malformation   []   Postpartum (within previous 14 days)   []   Recent GI or urinary tract hemorrhage (within previous 21 days)   []   Recent acute MI (within previous 3 months)   []   History of un-ruptured intracranial aneurysm LESS than 10 mm   []   History of ruptured intracranial aneurysm   []   Blood glucose LESS than 50 mg/dL (2.7 mmol/L)   []   Dural puncture within the last 7 days   []   Known GREATER than 10 cerebral microbleeds   Additional exclusions for patients with symptoms onset between 3 and 4.5 hours.  []   Age > 80.   []   On any anticoagulants regardless of INR  >>> Warfarin (Coumadin), Heparin, Enoxaparin (Lovenox), fondaparinux (Arixtra), bivalirudin (Angiomax), Argatroban, dabigatran  (Pradaxa), rivaroxaban (Xarelto), or apixaban (Eliquis)   []   Severe stroke (NIHSS > 25).   []   History of BOTH diabetes and previous ischemic stroke.   []   The risks and benefits have been discussed with the patient or family related to the administration of IV thrombolytic therapy for stroke symptoms.   []   I have discussed and reviewed the patient's case and imaging with the attending prior to IV thrombolytic therapy.   NA Time IV thrombolytic administered       Hospital Meds:  Scheduled- [START ON 5/11/2024] aspirin, 81 mg, Oral, Daily   Or  [START ON 5/11/2024] aspirin, 300 mg, Rectal, Daily  aspirin, 325 mg, Oral, Once  levothyroxine, 175 mcg, Oral, Q AM  rosuvastatin, 20 mg, Oral, Nightly  sodium chloride, 10 mL, Intravenous, Q12H  sodium chloride, 10 mL, Intravenous, Q12H      Infusions- Pharmacy Consult - Pharmacy to dose,        PRNs-   acetaminophen    Calcium Replacement - Follow Nurse / BPA Driven Protocol    HYDROcodone-acetaminophen    Magnesium Standard Dose Replacement - Follow Nurse / BPA Driven Protocol    Morphine **AND** naloxone    nitroglycerin    ondansetron    Pharmacy Consult - Pharmacy to dose    Phosphorus Replacement - Follow Nurse / BPA Driven Protocol    Potassium Replacement - Follow Nurse / BPA Driven Protocol    sodium chloride    sodium chloride    sodium chloride    sodium chloride    Functional Status Prior to Current Stroke/[place] Score:   MODIFIED SANIYA SCALE (to be assessed for each patient having history of stroke) []Stroke history but not assessed  [x]0: No symptoms at all  []1: No significant disability despite symptoms  []2: Slight disability  []3: Moderate disability  []4: Moderately severe disability  []5: Severe disability  []6: Death        NIH Stroke Scale  Time: 01:23 EDT  Person Administering Scale: RENAN Moncada  Interval: baseline  1a. Level of Consciousness: 0-->Alert, keenly responsive  1b. LOC Questions: 0-->Answers both questions correctly  1c. LOC  Commands: 0-->Performs both tasks correctly  2. Best Gaze: 0-->Normal  3. Visual: 2-->Complete hemianopia  4. Facial Palsy: 1-->Minor paralysis (flattened nasolabial fold, asymmetry on smiling)  5a. Motor Arm, Left: 0-->No drift, limb holds 90 (or 45) degrees for full 10 secs  5b. Motor Arm, Right: 0-->No drift, limb holds 90 (or 45) degrees for full 10 secs  6a. Motor Leg, Left: 1-->Drift, leg falls by the end of the 5-sec period but does not hit bed  6b. Motor Leg, Right: 0-->No drift, leg holds 30 degree position for full 5 secs  7. Limb Ataxia: 1-->Present in one limb  8. Sensory: 1-->Mild-to-moderate sensory loss, patient feels pinprick is less sharp or is dull on the affected side, or there is a loss of superficial pain with pinprick, but patient is aware of being touched  9. Best Language: 0-->No aphasia, normal  10. Dysarthria: 0-->Normal  11. Extinction and Inattention (formerly Neglect): 0-->No abnormality    Total (NIH Stroke Scale): 6     Results Reviewed:  I have personally reviewed current lab, radiology, and data and agree with results.    CBC  WBC 8.0  RBC 3.88  Hemoglobin 12.8  Hematocrit 37  Platelet 241    PT 10.3  INR 0.91    CMP  Glucose 151  Sodium 135  Potassium 3.6  Chloride 105  CO2 26  Bun 13  Creatinine 1.2  AST 22  ALT 15    CT head  Impression: No acute intracranial abnormality    CTA head  Impression: No large vessel stenosis or occlusion    CTA neck  Impression: Atherosclerotic plaque involving the proximal right internal carotid artery causing mild stenosis.  Atherosclerotic plaque involving the proximal left internal carotid artery causing moderate stenosis.  Right upper lobe subpleural nodularity that measures up to 1.2 cm that is uncertain if it represents a pulmonary nodule or pleural-parenchymal thickening/scarring.    CT cerebral perfusion  Impression: No acute infarct or ischemia.    Assessment/Plan:  Mr. Whitten is a 68-year-old male with PMH of hypothyroidism and prostate  cancer.  He presented to [facility] ER by [facility] after a witnessed syncopal episode with seizure-like activity for approximately 5 minutes.  Presentation to ER included left gaze preference, left-sided paralysis and paresthesias.  Initial NIH of 15 in the ER.  CT head/CTA/CT cerebral perfusion negative for infarct or occlusion.  ER provider reports presenting symptoms have now resolved and NIH is 0.  He was transferred to [facility] for higher level care and further stroke workup.  Patient is not a candidate for IV thrombolytic therapy due to last known well greater than 4.5 hours.  Patient is not a candidate for endovascular therapy due to no LVO on OSH CT scans.    Antiplatelet PTA: None  Anticoagulant PTA: None        Left-sided weakness and paresthesias  Differentials to include TIA, CVA, seizure  Initiate TIA/CVA without IV thrombolytic therapy order set  N.p.o. until bedside dysphagia screening complete by RN  Activity as tolerated  Aspirin 81 mg p.o. daily  Crestor p.o. pharmacy to dose  Okay for patient to have first dose of medications crushed with applesauce  MRI brain without contrast on 5/10 at 1200  TTE routine  A1c, lipid panel routine  EEG in a.m.  Seizure precautions  Blood pressure goals, SBP less than 200  Diabetes educator to see if appropriate  PT/OT/SLP eval and treat  Case management to follow      Plan of care discussed with Dr. Schuster, patient and primary RN.  Stroke neurology will continue to follow.  Thank you for this consult.  Call with any questions or concerns.    Javi Katz, APRN  May 10, 2024  01:23 EDT

## 2024-05-10 NOTE — PLAN OF CARE
Goal Outcome Evaluation:  Plan of Care Reviewed With: patient, spouse                  Anticipated Discharge Disposition (SLP): home    SLP Diagnosis: functional cognitive-linguistic skills (05/10/24 0830)  SLP Diagnosis Comments: at simple level. Higher level assessment to follow next session. (05/10/24 0830)  SLP Swallowing Diagnosis: swallow WFL/no suspected pharyngeal impairment (05/10/24 0830)

## 2024-05-10 NOTE — PROGRESS NOTES
"  Emmanule Whitten is a 68 y.o. male transferred here from the [facility] during the overnight shift on Thursday night (5/9).  He states that at around 815 earlier on Thursday night, he was outside the [employer] smoking with a friend and had rather sudden onset of dizziness.  He started to walk back inside the [employer], and he states the next thing he knows \"the world was really spinning,\" and he then only remembers awakening in the ER in [place].  On arrival to the hospital in [place], the ED note from there mentions the patient had witnessed seizure-like activity for approximately 5 minutes, but there is no further description of this; the patient has no recollection of this.  The ED note also mentions the patient had left-sided fixed gaze and could not talk or move his left side.  The patient confirms that he recalls having \"total numbness\" of the left side of his body, as well as left-sided hemiparesis.  He states these left-sided symptoms lasted for approximately 1 hour, and have resolved and have not recurred.  He states he occasionally has episodes of very mild dizziness and lightheadedness 20-30 minutes after eating, but has never had dizziness as bad as earlier tonight, and has never suffered a syncopal episode.  He does confirm that before the episode tonight, he had eaten dinner approximately 30 minutes prior.     He currently denies any dizziness/lightheadedness, headache, visual changes, slurred speech/facial droop, focal weakness, bowel habit change, chest pain, abdominal pain, or syncope.  His medical history is significant for hypothyroidism, history of cervical spine fracture, prostate cancer s/p CyberKnife, COPD for which he states he requires no treatment, and hyperlipidemia (he does not take a statin due to increased myalgia when previously on this class of medication).  He has history of herpes zoster in the right eye for which he takes as needed treatment.  He also states he had " surgery on the left side of his face and this has caused him to have some slight left-sided mouth asymmetry and tight skin which he states is chronic.      Syncope  Seizure activity, possible  Concern for CVA  - Daily aspirin and statin.  - HbA1c/lipid panel; PT/OT/SLP; neuro checks; 2D echo; MRI brain.  - Remainder of CVA workup per neurology team, will follow up their recommendations.  - Continue telemetry.  - Check a.m. troponin.  - Check EKG.  - Seizure precautions.  - N.p.o. until bedside swallow eval is passed.  - EEG.  - May require postprandial fingerstick glucose checks, given his stated history (see HPI).     Hypothyroidism  - Continue Synthroid from home regimen.  - Check TSH.     Hyperlipidemia  - Does not take a statin at home due to myalgia, will be on low-dose statin here per neurology service.  - Lipid panel with a.m. labs.     COPD  - Saturations currently good on room air, not currently in exacerbation.  - He states he requires no treatment for this, never experiences any dyspnea.     History of right eye herpes zoster  - Will continue home prednisolone eyedrops as needed.     Prostate cancer, history of  - S/p CyberKnife treatment here, he states treatment for this is complete, no acute issues.

## 2024-05-10 NOTE — CONSULTS
Diabetes Education    Patient Name:  Emmanuel Whitten  YOB: 1955  MRN: [id number]  Admit Date:  5/10/2024        Order criteria not met for diabetes education consult. Current A1c is 5.4, noted during chart review. Pt has no history of DM and no home meds for DM. Thank you.      Electronically signed by:  Lucie Trimble RN  05/10/24 08:10 EDT

## 2024-05-10 NOTE — THERAPY EVALUATION
Acute Care - Speech Language Pathology   Swallow Initial Evaluation [facility]  Clinical Swallow Evaluation  Cognitive-Communication Evaluation     Patient Name: Emmanuel Whitten  : 1955  MRN: [id number]  Today's Date: 5/10/2024               Admit Date: 5/10/2024    Visit Dx:   No diagnosis found.  Patient Active Problem List   Diagnosis    Prostate cancer    Syncope     Past Medical History:   Diagnosis Date    Disease of thyroid gland     H/O cervical fracture     was in [place] for 6 weeks    Prostate cancer     Psoriasis     Skin cancer 2018    basal cell right neck     Past Surgical History:   Procedure Laterality Date    CARDIAC CATHETERIZATION  2013    x 3 all clean    CHOLECYSTECTOMY  2003    COLONOSCOPY  2017    CYBERKNIFE  2019    prostate    CYST REMOVAL      right wrist    FOOT FRACTURE SURGERY      KNEE ARTHROSCOPY Left 1988    MOHS SURGERY  2018    NOSE SURGERY  1995    PAROTIDECTOMY  2015    PROSTATE BIOPSY         SLP Recommendation and Plan  SLP Swallowing Diagnosis: swallow WFL/no suspected pharyngeal impairment (05/10/24 0830)  SLP Diet Recommendation: regular textures, thin liquids (05/10/24 0830)  Recommended Precautions and Strategies: upright posture during/after eating (05/10/24 0830)  SLP Rec. for Method of Medication Administration: as tolerated (05/10/24 0830)           Swallow Criteria for Skilled Therapeutic Interventions Met: no problems identified which require skilled intervention (05/10/24 0830)  Anticipated Discharge Disposition (SLP): home (05/10/24 0830)     Therapy Frequency (Swallow): evaluation only (05/10/24 0830)  Predicted Duration Therapy Intervention (Days): until discharge (05/10/24 0830)                                           Plan of Care Reviewed With: patient, spouse      SWALLOW EVALUATION (Last 72 Hours)       SLP Adult Swallow Evaluation       Row Name 05/10/24 0830                   Rehab Evaluation    Document Type evaluation  -         Subjective Information no complaints  -        Patient Observations alert;cooperative  -        Patient Effort good  -SM           General Information    Patient Profile Reviewed yes  -SM        Pertinent History Of Current Problem Adm L weakness and possible seizure. MRI pending.  -        Current Method of Nutrition NPO  -        Precautions/Limitations, Hearing hearing impairment, bilaterally  -        Prior Level of Function-Communication WFL  -        Prior Level of Function-Swallowing safe, efficient swallowing in all situations  -        Plans/Goals Discussed with patient;spouse/S.O.;agreed upon  -        Barriers to Rehab none identified  -        Patient's Goals for Discharge return to all previous roles/activities  -        Family Goals for Discharge family did not state  -           Pain    Additional Documentation Pain Scale: Numbers Pre/Post-Treatment (Group)  -           Pain Scale: Numbers Pre/Post-Treatment    Pretreatment Pain Rating 0/10 - no pain  -SM        Posttreatment Pain Rating 0/10 - no pain  -           Oral Musculature and Cranial Nerve Assessment    Oral Labial or Buccal Impairment, Detail, Cranial Nerve VII (Facial): left labial droop;other (see comments)  minimal  -SM           Clinical Swallow Eval    Oral Prep Phase WFL  -SM        Oral Transit WFL  -SM        Oral Residue WFL  -SM        Pharyngeal Phase no overt signs/symptoms of pharyngeal impairment  -        Esophageal Phase unremarkable  -           SLP Evaluation Clinical Impression    SLP Swallowing Diagnosis swallow WFL/no suspected pharyngeal impairment  -        Swallow Criteria for Skilled Therapeutic Interventions Met no problems identified which require skilled intervention  -           Recommendations    Therapy Frequency (Swallow) evaluation only  -        SLP Diet Recommendation regular textures;thin liquids  -        Recommended Precautions and Strategies upright posture  during/after eating  -        SLP Rec. for Method of Medication Administration as tolerated  -                  User Key  (r) = Recorded By, (t) = Taken By, (c) = Cosigned By      Initials Name Effective Dates    Olivia Ventura MS CCC-SLP 02/03/23 -                     EDUCATION  The patient has been educated in the following areas:   Dysphagia (Swallowing Impairment).        SLP GOALS       Row Name 05/10/24 0830             Patient will demonstrate functional cognitive-linguistic skills for return to discharge environment    [place] Independently  -      Time frame by discharge  -         SLP Diagnostic Treatment     Patient will participate in further assessment in the following areas reading comprehension;graphic expression;higher-level cognitive-linguistic  -      Time Frame (Diagnostic) 1 week  -                User Key  (r) = Recorded By, (t) = Taken By, (c) = Cosigned By      Initials Name Provider Type    lOivia Ventura MS CCC-SLP Speech and Language Pathologist                       Time Calculation:    Time Calculation- SLP       Row Name 05/10/24 1002             Time Calculation- SLP    SLP Start Time 0830  -      SLP Received On 05/10/24  -         Untimed Charges    52733-WV Eval Speech and Production w/ Language Minutes 24  -SM      08075-FA Eval Oral Pharyng Swallow Minutes 24  -SM         Total Minutes    Untimed Charges Total Minutes 48  -SM       Total Minutes 48  -SM                User Key  (r) = Recorded By, (t) = Taken By, (c) = Cosigned By      Initials Name Provider Type    Olivia Ventura MS CCC-SLP Speech and Language Pathologist                    Therapy Charges for Today       Code Description Service Date Service Provider Modifiers Qty    92575620649 HC ST EVAL ORAL PHARYNG SWALLOW 2 5/10/2024 Olivia Stinson MS CCC-SLP GN 1    36784383664 HC ST EVAL SPEECH AND PROD W LANG  2 5/10/2024 Olivia Stinson MS CCC-SLP GN 1                  Olivia Stinson, MS CCC-SLP  5/10/2024   and Acute Care - Speech Language Pathology Initial Evaluation  [facility]     Patient Name: Emmanuel Whitten  : 1955  MRN: [id number]  Today's Date: 5/10/2024               Admit Date: 5/10/2024     Visit Dx:  No diagnosis found.  Patient Active Problem List   Diagnosis    Prostate cancer    Syncope     Past Medical History:   Diagnosis Date    Disease of thyroid gland     H/O cervical fracture     was in [place] for 6 weeks    Prostate cancer     Psoriasis     Skin cancer 2018    basal cell right neck     Past Surgical History:   Procedure Laterality Date    CARDIAC CATHETERIZATION  2013    x 3 all clean    CHOLECYSTECTOMY  2003    COLONOSCOPY  2017    CYBERKNIFE  2019    prostate    CYST REMOVAL      right wrist    FOOT FRACTURE SURGERY      KNEE ARTHROSCOPY Left     MOHS SURGERY  2018    NOSE SURGERY  1995    PAROTIDECTOMY  2015    PROSTATE BIOPSY  2018       SLP Recommendation and Plan  SLP Diagnosis: functional cognitive-linguistic skills (05/10/24 0830)  SLP Diagnosis Comments: at simple level. Higher level assessment to follow next session. (05/10/24 0830)        Swallow Criteria for Skilled Therapeutic Interventions Met: no problems identified which require skilled intervention (05/10/24 0830)  SLC Criteria for Skilled Therapy Interventions Met: yes (05/10/24 0830)  Anticipated Discharge Disposition (SLP): home (05/10/24 0830)     Therapy Frequency (Swallow): evaluation only (05/10/24 0830)  Therapy Frequency (SLP SLC): 5 days per week (05/10/24 0830)  Predicted Duration Therapy Intervention (Days): until discharge (05/10/24 0830)                             Plan of Care Reviewed With: patient, spouse (05/10/24 1002)      SLP EVALUATION (Last 72 Hours)       SLP SLC Evaluation       Row Name 05/10/24 0830                   General Information    Prior Level of Function-Communication [facility]  -        Patient's Goals for Discharge return  to all previous roles/activities  -        Family Goals for Discharge patient able to return to previous activities/roles  -           Comprehension Assessment/Intervention    Comprehension Assessment/Intervention Auditory Comprehension  -           Auditory Comprehension Assessment/Intervention    Answers Questions (Communication) yes/no;wh questions;personal;simple;concrete;L  -        Able to Follow Commands (Communication) 1-step;2-step;[facility]  -        Narrative Discourse conversational level;[facility]  -           Expression Assessment/Intervention    Expression Assessment/Intervention verbal expression  -           Verbal Expression Assessment/Intervention    Conversational Discourse/Fluency [facility]  -           Motor Speech Assessment/Intervention    Motor Speech Function L  -           Cognitive Assessment Intervention- SLP    Orientation Status (Cognition) [facility]  -        Attention (Cognitive) selective;sustained;[facility]  -        Problem Solving (Cognitive) simple;[facility]  -        Cognition, Comment Simple cog-comm skills WFL. Repetitions only 2' decreased hearing (mainly when SLP not face-to-face communication). Pt eager for PO intake. Will continue higher level assessment next session.  -           SLP Evaluation Clinical Impressions    SLP Diagnosis functional cognitive-linguistic skills  -        SLP Diagnosis Comments at simple level. Higher level assessment to follow next session.  -        Rehab Potential/Prognosis good  -[facility] Criteria for Skilled Therapy Interventions Met yes  -           Recommendations    Therapy Frequency (SLP SLC) 5 days per week  -        Predicted Duration Therapy Intervention (Days) until discharge  -        Anticipated Discharge Disposition (SLP) home  -                  User Key  (r) = Recorded By, (t) = Taken By, (c) = Cosigned By      Initials Name Effective Dates     Olivia Stinson MS CCC-SLP 02/03/23 -                         EDUCATION  The patient has been educated in the following areas:     Cognitive Impairment Communication Impairment.           SLP GOALS       Row Name 05/10/24 0830             Patient will demonstrate functional cognitive-linguistic skills for return to discharge environment    [place] Independently  -      Time frame by discharge  -         SLP Diagnostic Treatment     Patient will participate in further assessment in the following areas reading comprehension;graphic expression;higher-level cognitive-linguistic  -      Time Frame (Diagnostic) 1 week  -                User Key  (r) = Recorded By, (t) = Taken By, (c) = Cosigned By      Initials Name Provider Type    Olivia Ventura MS CCC-SLP Speech and Language Pathologist                            Time Calculation:      Time Calculation- SLP       Row Name 05/10/24 1002             Time Calculation- SLP    SLP Start Time 0830  -      SLP Received On 05/10/24  -         Untimed Charges    67130-HB Eval Speech and Production w/ Language Minutes 24  -SM      33367-JV Eval Oral Pharyng Swallow Minutes 24  -SM         Total Minutes    Untimed Charges Total Minutes 48  -SM       Total Minutes 48  -SM                User Key  (r) = Recorded By, (t) = Taken By, (c) = Cosigned By      Initials Name Provider Type    Olivia Ventura MS CCC-SLP Speech and Language Pathologist                    Therapy Charges for Today       Code Description Service Date Service Provider Modifiers Qty    07298155242 HC ST EVAL ORAL PHARYNG SWALLOW 2 5/10/2024 Olivia Stinson MS CCC-SLP GN 1    40750435757 HC ST EVAL SPEECH AND PROD W LANG  2 5/10/2024 Olivia Stinson MS CCC-SLP GN 1                       Olivia Stinson MS CCC-MELISSA  5/10/2024

## 2024-05-10 NOTE — PROGRESS NOTES
"          Clinical Nutrition Assessment     Patient Name: Emmanuel Whitten  YOB: 1955  MRN: [id number]  Date of Encounter: 05/10/24 14:46 EDT  Admission date: 5/10/2024  Reason for Visit: MST score 2+, BMI, \"Unsure\" unintentional weight loss    Assessment   Nutrition Assessment   Admission Diagnosis:  Syncope [R55]    Problem List:    Syncope      PMH:   He  has a past medical history of Disease of thyroid gland, H/O cervical fracture, Prostate cancer, Psoriasis, and Skin cancer (2018).    PSH:  He  has a past surgical history that includes Prostate biopsy (2018); Colonoscopy (2017); Cholecystectomy (2003); Parotidectomy (2015); Mohs surgery (2018); Nose surgery (1995); Knee arthroscopy (Left, 1988); Foot fracture surgery; Cyst Removal; Cardiac catheterization (2013); and Cyberknife (01/11/2019).    Applicable Nutrition History:       Anthropometrics     Height: Height: 185 cm (72.84\")  Last Filed Weight: Weight: 64.9 kg (143 lb) (05/10/24 0036)  Method: Weight Method: Bed scale  BMI: BMI (Calculated): 19    UBW:  Weight has been stable over the past several years  Weight change: unchanged    Nutrition Focused Physical Exam    Date:  05/10/24        Patient meets criteria for malnutrition diagnosis, see MSA note.     Subjective   Reported/Observed/Food/Nutrition Related History:     05/10/24   Patient reported his appetite comes and goes. He normally eats around 2 meals per day. Declined any ONS options offered.    Current Nutrition Prescription   PO: Diet: Cardiac; Healthy Heart (2-3 Na+); Texture: Regular (IDDSI 7); Fluid Consistency: Thin (IDDSI 0)  Oral Nutrition Supplement: n/a  Intake:  Today: B 75%, L 50% PO intake documented    Assessment & Plan   Nutrition Diagnosis   Date:  05/10/24             Updated:    Problem Malnutrition  moderate   Etiology Chronic illness   Signs/Symptoms Severe muscle wasting, moderate fat loss, moderate decrease in energy intake   Status: New    Date:  05/10/24   "   Updated:     Problem Underweight   Etiology Inadequate energy intake   Signs/Symptoms BMI for older adults < 23 (18.95)   Status: New    Goal:   Nutrition to support treatment and Continue positive trend      Nutrition Intervention      Follow treatment progress, Care plan reviewed, Interview for preferences, Encourage intake, Nutrition support order placed    Patient meets malnutrition criteria, see MSA for full assessment.  Patient declined ONS options   Encourage adequate PO intake    Monitoring/Evaluation:   Per protocol, PO intake, Weight, Symptoms, POC/GOC    Essence Patel, MS,RD,LD  Time Spent: 30min

## 2024-05-10 NOTE — CASE MANAGEMENT/SOCIAL WORK
Discharge Planning Assessment  [facility]     Patient Name: Emmanuel Whitten  MRN: [id number]  Today's Date: 5/10/2024    Admit Date: 5/10/2024    Plan: Home   Discharge Needs Assessment       Row Name 05/10/24 1328       Living Environment    People in Home spouse    Current Living Arrangements home    Primary Care Provided by self    Provides Primary Care For no one    Family Caregiver if Needed spouse    Quality of Family Relationships supportive    Able to Return to Prior Arrangements yes       Transition Planning    Patient/Family Anticipates Transition to home with family    Patient/Family Anticipated Services at Transition none    Transportation Anticipated family or friend will provide       Discharge Needs Assessment    Readmission Within the Last 30 Days no previous admission in last 30 days    Equipment Currently Used at Home none                   Discharge Plan       Row Name 05/10/24 1329       Plan    Plan Home    Patient/Family in Agreement with Plan yes    Plan Comments Spoke with Mr. Whitten and Spouse at the bedside. He lives with his Spouse in [place]. He is independent with ADL's. He does not use any DME, Oxygen or home health. He does not have advance directives. His PCP is Keturah Ta and he has VA insurance and Medicare A and B. Discharge plan is home. CM will continue to follow for discharge needs.    Final Discharge Disposition Code 01 - home or self-care                  Continued Care and Services - Admitted Since 5/10/2024    No active coordination exists for this encounter.       Expected Discharge Date and Time       Expected Discharge Date Expected Discharge Time    May 13, 2024            Demographic Summary       Row Name 05/10/24 1327       General Information    Admission Type observation    Arrived From home    Referral Source admission list    Reason for Consult discharge planning    Preferred Language English       Contact Information    Permission Granted to Share  Info With                    Functional Status       Row Name 05/10/24 1328       Functional Status, IADL    Medications independent    Meal Preparation independent    Housekeeping independent    Laundry independent    Shopping independent       Mental Status    General Appearance WDL WDL                   Psychosocial    No documentation.                  Abuse/Neglect    No documentation.                  Legal    No documentation.                  Substance Abuse    No documentation.                  Patient Forms    No documentation.                     Tawana Garcia RN

## 2024-05-11 VITALS
TEMPERATURE: 98.1 F | WEIGHT: 143 LBS | HEIGHT: 72 IN | BODY MASS INDEX: 19.37 KG/M2 | DIASTOLIC BLOOD PRESSURE: 75 MMHG | RESPIRATION RATE: 18 BRPM | SYSTOLIC BLOOD PRESSURE: 124 MMHG | OXYGEN SATURATION: 93 % | HEART RATE: 88 BPM

## 2024-05-11 PROBLEM — E44.0 MODERATE MALNUTRITION: Status: ACTIVE | Noted: 2024-05-11

## 2024-05-11 PROCEDURE — G0378 HOSPITAL OBSERVATION PER HR: HCPCS

## 2024-05-11 PROCEDURE — 99238 HOSP IP/OBS DSCHRG MGMT 30/<: CPT | Performed by: INTERNAL MEDICINE

## 2024-05-11 RX ORDER — LEVETIRACETAM 500 MG/1
500 TABLET ORAL EVERY 12 HOURS SCHEDULED
Status: DISCONTINUED | OUTPATIENT
Start: 2024-05-11 | End: 2024-05-11 | Stop reason: HOSPADM

## 2024-05-11 RX ORDER — LEVETIRACETAM 500 MG/1
500 TABLET ORAL EVERY 12 HOURS SCHEDULED
Qty: 60 TABLET | Refills: 2 | Status: SHIPPED | OUTPATIENT
Start: 2024-05-11 | End: 2024-08-09

## 2024-05-11 RX ORDER — ROSUVASTATIN CALCIUM 20 MG/1
20 TABLET, COATED ORAL NIGHTLY
Qty: 90 TABLET | Refills: 0 | Status: SHIPPED | OUTPATIENT
Start: 2024-05-11 | End: 2024-08-09

## 2024-05-11 RX ADMIN — ASPIRIN 81 MG: 81 TABLET, CHEWABLE ORAL at 09:46

## 2024-05-11 RX ADMIN — LEVOTHYROXINE SODIUM 175 MCG: 25 TABLET ORAL at 05:24

## 2024-05-11 RX ADMIN — LEVETIRACETAM 500 MG: 500 TABLET, FILM COATED ORAL at 09:46

## 2024-05-11 NOTE — DISCHARGE SUMMARY
[facility] Medicine Services  DISCHARGE SUMMARY    Patient Name: Emmanuel Whitten  : 1955  MRN: [id number]    Date of Admission: 5/10/2024 12:28 AM  Date of Discharge:  2024  Primary Care Physician: Provider, No Known    Consults       No orders found for last 30 day(s).            Hospital Course     Presenting Problem:     Active Hospital Problems    Diagnosis  POA   • **Syncope [R55]  Yes   • Moderate malnutrition [E44.0]  Yes      Resolved Hospital Problems   No resolved problems to display.      Discharge diagnosis  New onset witnessed seizure  Dyslipidemia  Hypothyroidism        Hospital Course:  Emmanuel Whitten is a 68 y.o. male   With history of prostate cancer, dyslipidemia, COPD, ongoing tobacco use, hypothyroidism who presented to the hospital with syncopal episode.  He had witnessed seizure x 2 by his friend at scene before he was transferred to the hospital.  Seen by neurology team and extensive stroke workup was done and was negative.  Echo with no significant valvular heart disease or cardiomyopathy.  Neurology team recommended to start the patient on Keppra 500 mg twice daily and cleared him for discharge home to follow-up with neurology service as outpatient.  Because the patient has been having intermittent episodes of lightheadedness and interview of syncope, he was discharged with follow-up with heart and valve clinic for Holter monitor and further cardiac evaluation.  Did not have any major event while hospitalized and was discharged in a stable condition    Discharge Follow Up Recommendations for outpatient labs/diagnostics:  PCP 1 week  Heart and valve clinic in 1 week for Holter monitor  Neurology in 3 months    Day of Discharge     HPI:   Patient seen and examined this morning.  Comfortable in bed.  No lightheadedness or dizziness.  No seizure episodes.  Discussed discharge planning with him.  He will be going home today with close follow-up with  heart and valve clinic for Holter monitor    Review of Systems  General : no fevers, chills  CVS: No chest pain, palpitations  Respiratory: No cough, dyspnea  GI: No N/V/D, abd pain  10 point review of systems is negative except for what is mentioned in HPI    Vital Signs:   Temp:  [97.7 °F (36.5 °C)-98.3 °F (36.8 °C)] 98.1 °F (36.7 °C)  Heart Rate:  [61-88] 88  Resp:  [18] 18  BP: (119-125)/(58-82) 124/75  Flow (L/min):  [2] 2      Physical Exam:  General: Comfortable, not in distress, conversant and cooperative  Head: Atraumatic and normocephalic  Eyes: No Icterus. No pallor  Ears:  Ears appear intact with no abnormalities noted  Throat: No oral lesions, no thrush  Neck: Supple, trachea midline  Lungs: Clear to auscultation bilaterally, equal air entry, no wheezing or crackles  Heart:  Normal S1 and S2, no murmur, no gallop, No JVD, no lower extremity swelling  Abdomen:  Soft, no tenderness, no organomegaly, normal bowel sounds, no organomegaly  Extremities: pulses equal bilaterally  Skin: No bleeding, bruising or rash, normal skin turgor and elasticity  Neurologic: Cranial nerves appear intact with no evidence of facial asymmetry, normal motor and sensory functions in all 4 extremities  Psych: Alert and oriented x 3, normal mood    Pertinent  and/or Most Recent Results     LAB RESULTS:      Lab 05/10/24  0119   WBC 8.61   HEMOGLOBIN 14.1   HEMATOCRIT 41.7   PLATELETS 250   NEUTROS ABS 4.06   IMMATURE GRANS (ABS) 0.02   LYMPHS ABS 2.21   MONOS ABS 0.66   EOS ABS 1.60*   MCV 96.3         Lab 05/10/24  0328 05/10/24  0119   SODIUM  --  139   POTASSIUM  --  4.3   CHLORIDE  --  105   CO2  --  26.0   ANION GAP  --  8.0   BUN  --  13   CREATININE  --  1.07   EGFR  --  75.6   GLUCOSE  --  103*   CALCIUM  --  8.7   MAGNESIUM  --  2.2   HEMOGLOBIN A1C  --  5.40   TSH 0.197*  --          Lab 05/10/24  0119   TOTAL PROTEIN 6.5   ALBUMIN 3.6   GLOBULIN 2.9   ALT (SGPT) 9   AST (SGOT) 15   BILIRUBIN 0.2   ALK PHOS 104          Lab 05/10/24  0328 05/10/24  0119   HSTROP T 6 <6         Lab 05/10/24  0119   CHOLESTEROL 209*   LDL CHOL 149*   HDL CHOL 38*   TRIGLYCERIDES 121             Brief Urine Lab Results       None          Microbiology Results (last 10 days)       ** No results found for the last 240 hours. **            MRI Brain Without Contrast    Result Date: 5/10/2024  MRI BRAIN WO CONTRAST Date of Exam: 5/10/2024 5:26 PM EDT Indication: Stroke, follow up left gaze preference, left weakness.  Comparison: Outside head CT 5/9/2024 Technique:  Routine multiplanar/multisequence sequence images of the brain were obtained without contrast administration. Findings: No acute infarct or suspicious restricted diffusion. No definite large intracranial hemorrhage. Nonspecific susceptibility artifact noted within the cerebellar vermis (for example image 24 series 13). No mass effect, edema or midline shift Mild scattered periventricular and subcortical T2/FLAIR hyperintensities, nonspecific but most likely represents chronic small vessel ischemic changes. No definite extra-axial fluid collection. The ventricles are normal in size and configuration. Possible atrophy of the superior aspect of the cerebellum bilaterally. Status post left lens extraction. Otherwise the orbits are unremarkable. Small mucous retention cyst versus polyp within the right maxillary sinus. Otherwise the paranasal sinuses and mastoid air cells are clear The visualized soft tissues are unremarkable. No acute osseous abnormality.     Impression: No definite acute intracranial abnormality. Susceptibility artifact noted within the cerebellar vermis. There is a nonspecific finding but may represent hemosiderin staining secondary to sequela from prior CVA. There is possible mild atrophy of the superior aspect of the cerebellum bilaterally. No  definite acute intracranial hemorrhage. Electronically Signed: Mark Diaz DO  5/10/2024 6:48 PM EDT  Workstation ID:  [id number]    Adult Transthoracic Echo Complete W/ Cont if Necessary Per Protocol (With Agitated Saline)    Result Date: 5/10/2024  •  Left ventricular systolic function is normal. Calculated left ventricular EF = 58.9% Left ventricular ejection fraction appears to be 56 - 60%. •  Left ventricular diastolic function was normal. •  Saline test results are negative for right to left atrial level shunt. •  Estimated right ventricular systolic pressure from tricuspid regurgitation is normal (<35 mmHg).     EEG    Result Date: 5/10/2024  Reason for referral: 68 y.o.male with seizure-like activity Technical Summary:  A 19 channel digital EEG was performed using the international 10-20 placement system, including eye leads and EKG leads. Duration: 20 minutes Findings: The awake tracing shows diffuse medium amplitude intermixed theta and alpha activity present symmetrically over both hemispheres.  An unresponsive rhythm is evident over the occipital leads.  Sleep is seen with slowing of the background and vertex waves.  Hyperventilation is not performed.  Photic stimulation does not change the background.  No focal features or epileptiform activity are seen. Video: Available Technical quality: Superior EKG: Regular, 60 bpm SUMMARY: Normal EEG in the awake and asleep states No focal features or epileptiform activity are seen     Normal study This report is transcribed using the Dragon dictation system.      CT Outside Films    Result Date: 5/10/2024  This procedure was auto-finalized with no dictation required.             Results for orders placed during the hospital encounter of 05/10/24    Adult Transthoracic Echo Complete W/ Cont if Necessary Per Protocol (With Agitated Saline)    Interpretation Summary  •  Left ventricular systolic function is normal. Calculated left ventricular EF = 58.9% Left ventricular ejection fraction appears to be 56 - 60%.  •  Left ventricular diastolic function was normal.  •  Saline test results  are negative for right to left atrial level shunt.  •  Estimated right ventricular systolic pressure from tricuspid regurgitation is normal (<35 mmHg).      Plan for Follow-up of Pending Labs/Results:     Discharge Details        Discharge Medications        New Medications        Instructions Start Date   levETIRAcetam 500 MG tablet  Commonly known as: KEPPRA   500 mg, Oral, Every 12 Hours Scheduled      rosuvastatin 20 MG tablet  Commonly known as: CRESTOR   20 mg, Oral, Nightly             Continue These Medications        Instructions Start Date   FAMVIR PO   Famvir   once q day             ASK your doctor about these medications        Instructions Start Date   levothyroxine 175 MCG tablet  Commonly known as: SYNTHROID, LEVOTHROID   levothyroxine 175 mcg tablet      prednisoLONE acetate 1 % ophthalmic suspension  Commonly known as: PRED FORTE   prednisolone acetate 1 % eye drops,suspension   One drop OD 1 x day PRN               Allergies   Allergen Reactions   • Antihistamines, Diphenhydramine-Type Shortness Of Breath     Pt stated he is allergic to all type of antihistamines   • Bee Venom Swelling         Discharge Disposition:  Home or Self Care    Diet:  Hospital:  Diet Order   Procedures   • Diet: Cardiac; Healthy Heart (2-3 Na+); Texture: Regular (IDDSI 7); Fluid Consistency: Thin (IDDSI 0)       Diet Instructions       Diet: Regular/House Diet, Cardiac Diets; Healthy Heart (2-3 Na+); Thin (IDDSI 0)      Discharge Diet:  Regular/House Diet  Cardiac Diets       Cardiac Diet: Healthy Heart (2-3 Na+)    Fluid Consistency: Thin (IDDSI 0)             Activity:  Activity Instructions       Activity as Tolerated              Restrictions or Other Recommendations:  None        CODE STATUS:    Code Status and Medical Interventions:   Ordered at: 05/10/24 0109     Level Of Support Discussed With:    Patient     Code Status (Patient has no pulse and is not breathing):    CPR (Attempt to Resuscitate)     Medical  Interventions (Patient has pulse or is breathing):    Full Support       No future appointments.    Additional Instructions for the Follow-ups that You Need to Schedule       Ambulatory Referral to [facility], [place], operated by [facility] [place] [facility] [place]   As directed      Service Requested: Cardiac Monitor Arrhythmia Clinic                      Denis Sutherland MD  05/11/24      Time Spent on Discharge:  I spent  28  minutes on this discharge activity which included: face-to-face encounter with the patient, reviewing the data in the system, coordination of the care with the nursing staff as well as consultants, documentation, and entering orders.

## 2024-05-13 LAB
QT INTERVAL: 418 MS
QTC INTERVAL: 444 MS

## 2024-09-16 ENCOUNTER — OFFICE VISIT (OUTPATIENT)
Dept: NEUROLOGY | Facility: CLINIC | Age: 69
End: 2024-09-16
Payer: MEDICARE

## 2024-09-16 VITALS
OXYGEN SATURATION: 96 % | HEIGHT: 73 IN | SYSTOLIC BLOOD PRESSURE: 120 MMHG | HEART RATE: 92 BPM | BODY MASS INDEX: 18.95 KG/M2 | DIASTOLIC BLOOD PRESSURE: 70 MMHG | WEIGHT: 143 LBS

## 2024-09-16 DIAGNOSIS — R55 SYNCOPE AND COLLAPSE: Primary | ICD-10-CM

## 2024-09-16 DIAGNOSIS — R42 POSTURAL LIGHTHEADEDNESS: ICD-10-CM

## 2024-09-16 DIAGNOSIS — F17.200 SMOKER: ICD-10-CM

## 2024-09-16 PROCEDURE — 1159F MED LIST DOCD IN RCRD: CPT | Performed by: PSYCHIATRY & NEUROLOGY

## 2024-09-16 PROCEDURE — 99204 OFFICE O/P NEW MOD 45 MIN: CPT | Performed by: PSYCHIATRY & NEUROLOGY

## 2024-09-16 PROCEDURE — 1160F RVW MEDS BY RX/DR IN RCRD: CPT | Performed by: PSYCHIATRY & NEUROLOGY

## 2024-09-16 RX ORDER — PHENYLEPHRINE HYDROCHLORIDE 25 MG/ML
SOLUTION/ DROPS OPHTHALMIC
COMMUNITY

## 2024-10-08 ENCOUNTER — HOSPITAL ENCOUNTER (OUTPATIENT)
Dept: NEUROLOGY | Facility: HOSPITAL | Age: 69
Discharge: HOME | End: 2024-10-08
Admitting: PSYCHIATRY & NEUROLOGY
Payer: MEDICARE

## 2024-10-08 DIAGNOSIS — R55 SYNCOPE AND COLLAPSE: ICD-10-CM

## 2024-10-08 PROCEDURE — 95713 VEEG 2-12 HR CONT MNTR: CPT
